# Patient Record
Sex: MALE | Race: WHITE | HISPANIC OR LATINO | Employment: UNEMPLOYED | URBAN - METROPOLITAN AREA
[De-identification: names, ages, dates, MRNs, and addresses within clinical notes are randomized per-mention and may not be internally consistent; named-entity substitution may affect disease eponyms.]

---

## 2018-01-16 NOTE — MISCELLANEOUS
Provider Comments  Provider Comments:   Patient was a no show to today's appointment at 1000        Signatures   Electronically signed by : EVIE Perry; Aug 12 2016 10:09AM EST                       (Author)

## 2020-12-29 ENCOUNTER — NURSE TRIAGE (OUTPATIENT)
Dept: OTHER | Facility: OTHER | Age: 65
End: 2020-12-29

## 2022-08-05 ENCOUNTER — APPOINTMENT (EMERGENCY)
Dept: RADIOLOGY | Facility: HOSPITAL | Age: 67
End: 2022-08-05
Payer: MEDICARE

## 2022-08-05 ENCOUNTER — HOSPITAL ENCOUNTER (EMERGENCY)
Facility: HOSPITAL | Age: 67
Discharge: HOME | End: 2022-08-05
Attending: EMERGENCY MEDICINE | Admitting: EMERGENCY MEDICINE
Payer: MEDICARE

## 2022-08-05 VITALS
BODY MASS INDEX: 20.44 KG/M2 | HEART RATE: 79 BPM | SYSTOLIC BLOOD PRESSURE: 160 MMHG | TEMPERATURE: 97.3 F | RESPIRATION RATE: 22 BRPM | HEIGHT: 64 IN | DIASTOLIC BLOOD PRESSURE: 90 MMHG | WEIGHT: 119.71 LBS | OXYGEN SATURATION: 100 %

## 2022-08-05 DIAGNOSIS — T50.901A OVERDOSE: Primary | ICD-10-CM

## 2022-08-05 LAB
AMPHETAMINES SERPL QL SCN: NEGATIVE
APAP SERPL-MCNC: <10 UG/ML (ref 10–20)
APTT PPP: 25 SECONDS (ref 23–37)
ATRIAL RATE: 77 BPM
BARBITURATES UR QL: NEGATIVE
BASOPHILS # BLD AUTO: 0.07 THOUSANDS/ΜL (ref 0–0.1)
BASOPHILS NFR BLD AUTO: 1 % (ref 0–1)
BENZODIAZ UR QL: NEGATIVE
CARDIAC TROPONIN I PNL SERPL HS: 4 NG/L
COCAINE UR QL: POSITIVE
EOSINOPHIL # BLD AUTO: 0.26 THOUSAND/ΜL (ref 0–0.61)
EOSINOPHIL NFR BLD AUTO: 4 % (ref 0–6)
ERYTHROCYTE [DISTWIDTH] IN BLOOD BY AUTOMATED COUNT: 13.7 % (ref 11.6–15.1)
ETHANOL SERPL-MCNC: <10 MG/DL (ref 0–10)
HCT VFR BLD AUTO: 40.2 % (ref 36.5–49.3)
HGB BLD-MCNC: 12.1 G/DL (ref 12–17)
IMM GRANULOCYTES # BLD AUTO: 0.01 THOUSAND/UL (ref 0–0.2)
IMM GRANULOCYTES NFR BLD AUTO: 0 % (ref 0–2)
INR PPP: 0.94 (ref 0.84–1.19)
LYMPHOCYTES # BLD AUTO: 3.55 THOUSANDS/ΜL (ref 0.6–4.47)
LYMPHOCYTES NFR BLD AUTO: 47 % (ref 14–44)
MCH RBC QN AUTO: 26.9 PG (ref 26.8–34.3)
MCHC RBC AUTO-ENTMCNC: 30.1 G/DL (ref 31.4–37.4)
MCV RBC AUTO: 90 FL (ref 82–98)
METHADONE UR QL: POSITIVE
MONOCYTES # BLD AUTO: 0.51 THOUSAND/ΜL (ref 0.17–1.22)
MONOCYTES NFR BLD AUTO: 7 % (ref 4–12)
NEUTROPHILS # BLD AUTO: 3.02 THOUSANDS/ΜL (ref 1.85–7.62)
NEUTS SEG NFR BLD AUTO: 41 % (ref 43–75)
NRBC BLD AUTO-RTO: 0 /100 WBCS
OPIATES UR QL SCN: NEGATIVE
OXYCODONE+OXYMORPHONE UR QL SCN: NEGATIVE
P AXIS: 72 DEGREES
PCP UR QL: NEGATIVE
PHOSPHATE SERPL-MCNC: 4.7 MG/DL (ref 2.5–4.8)
PLATELET # BLD AUTO: 315 THOUSANDS/UL (ref 149–390)
PMV BLD AUTO: 9.2 FL (ref 8.9–12.7)
PR INTERVAL: 172 MS
PROTHROMBIN TIME: 12.9 SECONDS (ref 11.6–14.5)
QRS AXIS: 35 DEGREES
QRSD INTERVAL: 82 MS
QT INTERVAL: 388 MS
QTC INTERVAL: 439 MS
RBC # BLD AUTO: 4.49 MILLION/UL (ref 3.88–5.62)
SALICYLATES SERPL-MCNC: <1 MG/DL (ref 10–30)
T WAVE AXIS: 48 DEGREES
THC UR QL: POSITIVE
TSH SERPL DL<=0.05 MIU/L-ACNC: 6.35 UIU/ML (ref 0.45–4.5)
VENTRICULAR RATE: 77 BPM
WBC # BLD AUTO: 7.42 THOUSAND/UL (ref 4.31–10.16)

## 2022-08-05 PROCEDURE — 84484 ASSAY OF TROPONIN QUANT: CPT | Performed by: EMERGENCY MEDICINE

## 2022-08-05 PROCEDURE — 80307 DRUG TEST PRSMV CHEM ANLYZR: CPT | Performed by: EMERGENCY MEDICINE

## 2022-08-05 PROCEDURE — 80179 DRUG ASSAY SALICYLATE: CPT | Performed by: EMERGENCY MEDICINE

## 2022-08-05 PROCEDURE — 85730 THROMBOPLASTIN TIME PARTIAL: CPT | Performed by: EMERGENCY MEDICINE

## 2022-08-05 PROCEDURE — 71045 X-RAY EXAM CHEST 1 VIEW: CPT

## 2022-08-05 PROCEDURE — 93010 ELECTROCARDIOGRAM REPORT: CPT

## 2022-08-05 PROCEDURE — 99291 CRITICAL CARE FIRST HOUR: CPT | Performed by: EMERGENCY MEDICINE

## 2022-08-05 PROCEDURE — 80143 DRUG ASSAY ACETAMINOPHEN: CPT | Performed by: EMERGENCY MEDICINE

## 2022-08-05 PROCEDURE — 84443 ASSAY THYROID STIM HORMONE: CPT | Performed by: EMERGENCY MEDICINE

## 2022-08-05 PROCEDURE — 93005 ELECTROCARDIOGRAM TRACING: CPT

## 2022-08-05 PROCEDURE — 82077 ASSAY SPEC XCP UR&BREATH IA: CPT | Performed by: EMERGENCY MEDICINE

## 2022-08-05 PROCEDURE — 85025 COMPLETE CBC W/AUTO DIFF WBC: CPT | Performed by: EMERGENCY MEDICINE

## 2022-08-05 PROCEDURE — 99284 EMERGENCY DEPT VISIT MOD MDM: CPT

## 2022-08-05 PROCEDURE — 85610 PROTHROMBIN TIME: CPT | Performed by: EMERGENCY MEDICINE

## 2022-08-05 PROCEDURE — 96374 THER/PROPH/DIAG INJ IV PUSH: CPT

## 2022-08-05 PROCEDURE — 96361 HYDRATE IV INFUSION ADD-ON: CPT

## 2022-08-05 PROCEDURE — 36415 COLL VENOUS BLD VENIPUNCTURE: CPT | Performed by: EMERGENCY MEDICINE

## 2022-08-05 PROCEDURE — 84100 ASSAY OF PHOSPHORUS: CPT | Performed by: EMERGENCY MEDICINE

## 2022-08-05 RX ORDER — NALOXONE HYDROCHLORIDE 1 MG/ML
1 INJECTION INTRAMUSCULAR; INTRAVENOUS; SUBCUTANEOUS ONCE
Status: COMPLETED | OUTPATIENT
Start: 2022-08-05 | End: 2022-08-05

## 2022-08-05 RX ORDER — NALOXONE HYDROCHLORIDE 1 MG/ML
INJECTION INTRAMUSCULAR; INTRAVENOUS; SUBCUTANEOUS
Status: COMPLETED
Start: 2022-08-05 | End: 2022-08-05

## 2022-08-05 RX ADMIN — NALOXONE HYDROCHLORIDE 1 MG: 1 INJECTION, SOLUTION INTRAMUSCULAR; INTRAVENOUS; SUBCUTANEOUS at 16:25

## 2022-08-05 RX ADMIN — SODIUM CHLORIDE 1000 ML: 9 INJECTION, SOLUTION INTRAVENOUS at 16:26

## 2022-08-05 RX ADMIN — NALOXONE HYDROCHLORIDE 1 MG: 1 INJECTION INTRAMUSCULAR; INTRAVENOUS; SUBCUTANEOUS at 16:25

## 2022-08-05 NOTE — ED NOTES
Pt states he would like to leave and states "I don't care if I have to sign a paper, I need to find my wallet " Provider aware        Leon Desir RN  08/05/22 6370

## 2022-08-05 NOTE — ED PROVIDER NOTES
History  Chief Complaint   Patient presents with    Overdose - Accidental     Patient arrives with his grand kids unresponsive; given 1mg Narcan IV and patient is now alert  Pt denies any drug use     HPI      This is a very pleasant, ill-appearing 71-year-old gentleman presents emergency room change of mental status, arrived here with family, patient immediately brought back from the triage room to room 1 with decreased respiratory effort  Patient was last seen 2 hours ago was found outside his family's house and hot ambient temp brought here for further evaluation  None       Past Medical History:   Diagnosis Date    Asthma        History reviewed  No pertinent surgical history  History reviewed  No pertinent family history  I have reviewed and agree with the history as documented  E-Cigarette/Vaping     E-Cigarette/Vaping Substances     Social History     Tobacco Use    Smoking status: Current Every Day Smoker     Packs/day: 0 50     Types: Cigarettes    Smokeless tobacco: Never Used   Substance Use Topics    Alcohol use: Not Currently    Drug use: Yes     Types: Marijuana       Review of Systems   Unable to perform ROS: Patient unresponsive       Physical Exam  Physical Exam  Vitals and nursing note reviewed  Constitutional:       Appearance: He is ill-appearing  HENT:      Head: Normocephalic and atraumatic  Right Ear: Tympanic membrane, ear canal and external ear normal       Left Ear: Tympanic membrane, ear canal and external ear normal       Nose: Nose normal       Mouth/Throat:      Mouth: Mucous membranes are moist       Pharynx: Oropharynx is clear  Eyes:      Extraocular Movements: Extraocular movements intact  Conjunctiva/sclera: Conjunctivae normal       Comments: Pupils 1 mm, minimally reactive  Cardiovascular:      Rate and Rhythm: Normal rate  Pulmonary:      Effort: Bradypnea present  Breath sounds: Normal breath sounds     Abdominal:      General: Abdomen is flat  Bowel sounds are normal    Musculoskeletal:         General: Normal range of motion  Cervical back: Normal range of motion  Skin:     General: Skin is warm  Neurological:      Mental Status: He is lethargic and disoriented  Psychiatric:      Comments: Full neuro exam could not be initially performed  Vital Signs  ED Triage Vitals   Temperature Pulse Respirations Blood Pressure SpO2   08/05/22 1633 08/05/22 1618 08/05/22 1618 08/05/22 1618 08/05/22 1618   (!) 97 3 °F (36 3 °C) 80 22 160/90 100 %      Temp Source Heart Rate Source Patient Position - Orthostatic VS BP Location FiO2 (%)   08/05/22 1633 08/05/22 1618 -- -- --   Oral Monitor         Pain Score       --                  Vitals:    08/05/22 1618 08/05/22 1700   BP: 160/90    Pulse: 80 79         Visual Acuity      ED Medications  Medications   naloxone (NARCAN) injection 1 mg (1 mg Intravenous Given 8/5/22 1625)   sodium chloride 0 9 % bolus 1,000 mL (0 mL Intravenous Stopped 8/5/22 1706)       Diagnostic Studies  Results Reviewed     Procedure Component Value Units Date/Time    HS Troponin I 4hr [[id number]]     Lab Status: No result Specimen: Blood     Rapid drug screen, urine [[id number]]  (Abnormal) Collected: 08/05/22 1702    Lab Status: Final result Specimen: Urine, Clean Catch Updated: 08/05/22 1748     Amph/Meth UR Negative     Barbiturate Ur Negative     Benzodiazepine Urine Negative     Cocaine Urine Positive     Methadone Urine Positive     Opiate Urine Negative     PCP Ur Negative     THC Urine Positive     Oxycodone Urine Negative    Narrative:      Presumptive report  If requested, specimen will be sent to reference lab for confirmation  FOR MEDICAL PURPOSES ONLY  IF CONFIRMATION NEEDED PLEASE CONTACT THE LAB WITHIN 5 DAYS      Drug Screen Cutoff Levels:  AMPHETAMINE/METHAMPHETAMINES  1000 ng/mL  BARBITURATES     200 ng/mL  BENZODIAZEPINES     200 ng/mL  COCAINE      300 ng/mL  METHADONE      300 ng/mL  OPIATES      300 ng/mL  PHENCYCLIDINE     25 ng/mL  THC       50 ng/mL  OXYCODONE      100 ng/mL    TSH [[id number]]  (Abnormal) Collected: 08/05/22 1622    Lab Status: Final result Specimen: Blood from Arm, Right Updated: 08/05/22 1744     TSH 3RD GENERATON 6 350 uIU/mL     Narrative:      Patients undergoing fluorescein dye angiography may retain small amounts of fluorescein in the body for 48-72 hours post procedure  Samples containing fluorescein can produce falsely depressed TSH values  If the patient had this procedure,a specimen should be resubmitted post fluorescein clearance        HS Troponin 0hr (reflex protocol) [[id number]]  (Normal) Collected: 08/05/22 1622    Lab Status: Final result Specimen: Blood from Arm, Right Updated: 08/05/22 1708     hs TnI 0hr 4 ng/L     HS Troponin I 2hr [[id number]]     Lab Status: No result Specimen: Blood     Phosphorus [[id number]]  (Normal) Collected: 08/05/22 1622    Lab Status: Final result Specimen: Blood from Arm, Right Updated: 08/05/22 1659     Phosphorus 4 7 mg/dL     Ethanol [[id number]]  (Normal) Collected: 08/05/22 1622    Lab Status: Final result Specimen: Blood from Arm, Right Updated: 08/05/22 1659     Ethanol Lvl <77 mg/dL     Salicylate level [[id number]]  (Abnormal) Collected: 08/05/22 1622    Lab Status: Final result Specimen: Blood from Arm, Right Updated: 88/25/38 6722     Salicylate Lvl <2 6 mg/dL     Acetaminophen level-"If concentration is detectable, please discuss with medical  on call " [[id number]]  (Abnormal) Collected: 08/05/22 1622    Lab Status: Final result Specimen: Blood from Arm, Right Updated: 08/05/22 1659     Acetaminophen Level <10 ug/mL     Protime-INR [[id number]]  (Normal) Collected: 08/05/22 1622    Lab Status: Final result Specimen: Blood from Arm, Right Updated: 08/05/22 1654     Protime 12 9 seconds      INR 0 94    APTT [[id number]]  (Normal) Collected: 08/05/22 1622    Lab Status: Final result Specimen: Blood from Arm, Right Updated: 08/05/22 1654     PTT 25 seconds     CBC and differential [[id number]]  (Abnormal) Collected: 08/05/22 1622    Lab Status: Final result Specimen: Blood from Arm, Right Updated: 08/05/22 1634     WBC 7 42 Thousand/uL      RBC 4 49 Million/uL      Hemoglobin 12 1 g/dL      Hematocrit 40 2 %      MCV 90 fL      MCH 26 9 pg      MCHC 30 1 g/dL      RDW 13 7 %      MPV 9 2 fL      Platelets 906 Thousands/uL      nRBC 0 /100 WBCs      Neutrophils Relative 41 %      Immat GRANS % 0 %      Lymphocytes Relative 47 %      Monocytes Relative 7 %      Eosinophils Relative 4 %      Basophils Relative 1 %      Neutrophils Absolute 3 02 Thousands/µL      Immature Grans Absolute 0 01 Thousand/uL      Lymphocytes Absolute 3 55 Thousands/µL      Monocytes Absolute 0 51 Thousand/µL      Eosinophils Absolute 0 26 Thousand/µL      Basophils Absolute 0 07 Thousands/µL                  XR chest 1 view portable   ED Interpretation by Gerardo Fernández III, DO (08/05 1646)   No acute fractures noted, old healed rib fractures: L sided: lateral: 6th and 7th rib, no PTX, no infiltrate  Procedures  ECG 12 Lead Documentation Only    Date/Time: 8/5/2022 4:30 PM  Performed by: Natacha Nelson DO  Authorized by: Gerardo Fernández III, DO     Indications / Diagnosis:  Overdose  ECG reviewed by me, the ED Provider: yes    Patient location:  ED  Comments:      I personally reviewed this EKG is performed the patient August 5, 2022, EKG was completed at 4:30 p m  Interpreted by me same time, normal sinus rhythm with no acute ST abnormalities, baseline artifact noted in V6 otherwise within normal limits  Ventricular rate of 77 beats per minute  No diffuse elevations to indicate pericarditis  No coved ST elevations greater than 2mm with negative T waves in V1-3 to indicate concern for brugada  No biphasic T waves in V2, V3 to indicate Wellens (critical stenosis of LAD)     No elevation in aVR or deviation when compared to V1 (can be associated with ST depression in I,II, V4-6 when left main occlusion is present)  CriticalCare Time  Performed by: Henry Gar DO  Authorized by: Henry Gar DO     Critical care provider statement:     Critical care time (minutes):  35    Critical care start time:  8/5/2022 4:19 PM    Critical care end time:  8/5/2022 5:00 PM    Critical care time was exclusive of:  Separately billable procedures and treating other patients    Critical care was necessary to treat or prevent imminent or life-threatening deterioration of the following conditions:  CNS failure or compromise, cardiac failure, circulatory failure and toxidrome             ED Course  ED Course as of 08/05/22 1913   Fri Aug 05, 2022   1619 PATIENT ARRIVED WITH PINPOINT PUPILS, decreased respiratory rate:  Low 12, O2 saturation of 88% on room air, outside for on accountable amount of time approximately 2 hours as per the son concern for possible opiate overdose, Narcan ordered, given a mental status improved GCS 15 pupils are now 3 mm reactive O2 saturation of % room air    1651 Reassessed, GCS 15, ESCALERA x 3, admits that he consumed marijuana today, potential for the marijuana upon to be placed with opiate, exam was not consistent with marijuana intoxication more consistent with opiate overdose which was unintentional   Patient is stable, O2 saturation 100%  MDM  Number of Diagnoses or Management Options  Overdose  Diagnosis management comments: 58-year-old gentle presents the emergency department with symptoms consistent with a opiate overdose    Patient admits that he was consumed marijuana but was found unresponsive by family members brought to the emergency department, pinpoint pupils, respiratory rate less than 12, concern for opiate overdose Narcan administered, symptoms resolved, after approximately an hour half the emergency department patient requested to be discharged, baseline labs unremarkable  EKG unremarkable, troponin levels negative  Drug screen was positive for methadone, cocaine and marijuana  Patient does go to [facility], denies use of cocaine  There has been care reports that acuity of marijuana laced with fentanyl which would explain patient's presentation aches he smokes marijuana for the last 51 years  Patient stable at discharge    Portions of the record may have been created with voice recognition software  Occasional wrong word or "sound a like" substitutions may have occurred due to the inherent limitations of voice recognition software  Read the chart carefully and recognize, using context, where substitutions have occurred  Counseling: I had a detailed discussion with the patient and/or guardian regarding: the historical points, exam findings, and any diagnostic results supporting the discharge diagnosis, lab results, radiology results, discharge instructions reviewed with patient and/or family/caregiver and understanding was verbalized   Instructions given to return to the emergency department if symptoms worsen or persist, or if there are any questions or concerns that arise at home         Amount and/or Complexity of Data Reviewed  Clinical lab tests: ordered and reviewed  Tests in the radiology section of CPT®: ordered and reviewed  Tests in the medicine section of CPT®: ordered and reviewed  Discussion of test results with the performing providers: yes  Decide to obtain previous medical records or to obtain history from someone other than the patient: yes  Obtain history from someone other than the patient: yes (Granddaughter and grandson )  Review and summarize past medical records: yes  Discuss the patient with other providers: yes  Independent visualization of images, tracings, or specimens: yes        Disposition  Final diagnoses:   Overdose     Time reflects when diagnosis was documented in both MDM as applicable and the Disposition within this note     Time User Action Codes Description Comment    8/5/2022  5:54 PM [place], [address] Overdose       ED Disposition     ED Disposition   Discharge    Condition   Stable    Date/Time   Fri Aug 5, 2022  5:54 PM    [address] [facility] discharge to home/self care  Follow-up Information    None         There are no discharge medications for this patient  No discharge procedures on file      PDMP Review     None          ED Provider  Electronically Signed by           Xiomara Alicia III, DO  08/05/22 0628

## 2022-08-05 NOTE — ED NOTES
Pt ambulated to restroom on unit with no difficulty and with steady gait        Teresita Samaniego RN  08/05/22 8113

## 2022-10-25 ENCOUNTER — APPOINTMENT (EMERGENCY)
Dept: RADIOLOGY | Facility: HOSPITAL | Age: 67
End: 2022-10-25
Payer: MEDICARE

## 2022-10-25 ENCOUNTER — APPOINTMENT (EMERGENCY)
Dept: CT IMAGING | Facility: HOSPITAL | Age: 67
End: 2022-10-25
Payer: MEDICARE

## 2022-10-25 ENCOUNTER — HOSPITAL ENCOUNTER (EMERGENCY)
Facility: HOSPITAL | Age: 67
Discharge: HOME | End: 2022-10-25
Attending: EMERGENCY MEDICINE
Payer: MEDICARE

## 2022-10-25 VITALS
OXYGEN SATURATION: 100 % | DIASTOLIC BLOOD PRESSURE: 70 MMHG | SYSTOLIC BLOOD PRESSURE: 132 MMHG | TEMPERATURE: 96.8 F | HEART RATE: 73 BPM | RESPIRATION RATE: 18 BRPM | BODY MASS INDEX: 20.36 KG/M2 | WEIGHT: 118.61 LBS

## 2022-10-25 DIAGNOSIS — R42 DIZZINESS: Primary | ICD-10-CM

## 2022-10-25 DIAGNOSIS — R06.02 SHORTNESS OF BREATH: ICD-10-CM

## 2022-10-25 LAB
ALBUMIN SERPL BCP-MCNC: 4.1 G/DL (ref 3.5–5)
ALP SERPL-CCNC: 71 U/L (ref 43–122)
ALT SERPL W P-5'-P-CCNC: 17 U/L
AMPHETAMINES SERPL QL SCN: NEGATIVE
ANION GAP SERPL CALCULATED.3IONS-SCNC: 8 MMOL/L (ref 5–14)
APTT PPP: 27 SECONDS (ref 23–37)
AST SERPL W P-5'-P-CCNC: 30 U/L (ref 17–59)
ATRIAL RATE: 75 BPM
BACTERIA UR QL AUTO: ABNORMAL /HPF
BARBITURATES UR QL: NEGATIVE
BASOPHILS # BLD AUTO: 0.05 THOUSANDS/ÂΜL (ref 0–0.1)
BASOPHILS NFR BLD AUTO: 1 % (ref 0–1)
BENZODIAZ UR QL: NEGATIVE
BILIRUB SERPL-MCNC: 0.41 MG/DL (ref 0.2–1)
BILIRUB UR QL STRIP: NEGATIVE
BUN SERPL-MCNC: 12 MG/DL (ref 5–25)
CALCIUM SERPL-MCNC: 9 MG/DL (ref 8.4–10.2)
CARDIAC TROPONIN I PNL SERPL HS: 3 NG/L
CHLORIDE SERPL-SCNC: 107 MMOL/L (ref 96–108)
CLARITY UR: CLEAR
CO2 SERPL-SCNC: 25 MMOL/L (ref 21–32)
COCAINE UR QL: POSITIVE
COLOR UR: YELLOW
CREAT SERPL-MCNC: 0.82 MG/DL (ref 0.7–1.5)
EOSINOPHIL # BLD AUTO: 0.12 THOUSAND/ÂΜL (ref 0–0.61)
EOSINOPHIL NFR BLD AUTO: 2 % (ref 0–6)
ERYTHROCYTE [DISTWIDTH] IN BLOOD BY AUTOMATED COUNT: 13.7 % (ref 11.6–15.1)
GFR SERPL CREATININE-BSD FRML MDRD: 92 ML/MIN/1.73SQ M
GLUCOSE SERPL-MCNC: 88 MG/DL (ref 70–99)
GLUCOSE UR STRIP-MCNC: NEGATIVE MG/DL
HCT VFR BLD AUTO: 38 % (ref 36.5–49.3)
HGB BLD-MCNC: 11.3 G/DL (ref 12–17)
HGB UR QL STRIP.AUTO: 25
IMM GRANULOCYTES # BLD AUTO: 0.02 THOUSAND/UL (ref 0–0.2)
IMM GRANULOCYTES NFR BLD AUTO: 0 % (ref 0–2)
INR PPP: 0.98 (ref 0.84–1.19)
KETONES UR STRIP-MCNC: NEGATIVE MG/DL
LEUKOCYTE ESTERASE UR QL STRIP: NEGATIVE
LYMPHOCYTES # BLD AUTO: 1.77 THOUSANDS/ÂΜL (ref 0.6–4.47)
LYMPHOCYTES NFR BLD AUTO: 26 % (ref 14–44)
MCH RBC QN AUTO: 27 PG (ref 26.8–34.3)
MCHC RBC AUTO-ENTMCNC: 29.7 G/DL (ref 31.4–37.4)
MCV RBC AUTO: 91 FL (ref 82–98)
METHADONE UR QL: NEGATIVE
MONOCYTES # BLD AUTO: 0.38 THOUSAND/ÂΜL (ref 0.17–1.22)
MONOCYTES NFR BLD AUTO: 6 % (ref 4–12)
MUCOUS THREADS UR QL AUTO: ABNORMAL
NEUTROPHILS # BLD AUTO: 4.53 THOUSANDS/ÂΜL (ref 1.85–7.62)
NEUTS SEG NFR BLD AUTO: 65 % (ref 43–75)
NITRITE UR QL STRIP: NEGATIVE
NON-SQ EPI CELLS URNS QL MICRO: ABNORMAL /HPF
NRBC BLD AUTO-RTO: 0 /100 WBCS
NT-PROBNP SERPL-MCNC: 123 PG/ML (ref 0–299)
OPIATES UR QL SCN: NEGATIVE
OXYCODONE+OXYMORPHONE UR QL SCN: NEGATIVE
P AXIS: 73 DEGREES
PCP UR QL: NEGATIVE
PH UR STRIP.AUTO: 6 [PH]
PLATELET # BLD AUTO: 288 THOUSANDS/UL (ref 149–390)
PMV BLD AUTO: 9.3 FL (ref 8.9–12.7)
POTASSIUM SERPL-SCNC: 4.7 MMOL/L (ref 3.5–5.3)
PR INTERVAL: 156 MS
PROT SERPL-MCNC: 8.1 G/DL (ref 6.4–8.4)
PROT UR STRIP-MCNC: NEGATIVE MG/DL
PROTHROMBIN TIME: 13.2 SECONDS (ref 11.6–14.5)
QRS AXIS: 24 DEGREES
QRSD INTERVAL: 84 MS
QT INTERVAL: 378 MS
QTC INTERVAL: 422 MS
RBC # BLD AUTO: 4.19 MILLION/UL (ref 3.88–5.62)
RBC #/AREA URNS AUTO: ABNORMAL /HPF
SODIUM SERPL-SCNC: 140 MMOL/L (ref 135–147)
SP GR UR STRIP.AUTO: 1.02 (ref 1–1.04)
T WAVE AXIS: 59 DEGREES
THC UR QL: POSITIVE
UROBILINOGEN UA: NEGATIVE MG/DL
VENTRICULAR RATE: 75 BPM
WBC # BLD AUTO: 6.87 THOUSAND/UL (ref 4.31–10.16)
WBC #/AREA URNS AUTO: ABNORMAL /HPF

## 2022-10-25 PROCEDURE — 36415 COLL VENOUS BLD VENIPUNCTURE: CPT | Performed by: EMERGENCY MEDICINE

## 2022-10-25 PROCEDURE — 93005 ELECTROCARDIOGRAM TRACING: CPT

## 2022-10-25 PROCEDURE — G1004 CDSM NDSC: HCPCS

## 2022-10-25 PROCEDURE — 85025 COMPLETE CBC W/AUTO DIFF WBC: CPT | Performed by: EMERGENCY MEDICINE

## 2022-10-25 PROCEDURE — 70450 CT HEAD/BRAIN W/O DYE: CPT

## 2022-10-25 PROCEDURE — 84484 ASSAY OF TROPONIN QUANT: CPT | Performed by: EMERGENCY MEDICINE

## 2022-10-25 PROCEDURE — 93010 ELECTROCARDIOGRAM REPORT: CPT | Performed by: INTERNAL MEDICINE

## 2022-10-25 PROCEDURE — 83880 ASSAY OF NATRIURETIC PEPTIDE: CPT | Performed by: EMERGENCY MEDICINE

## 2022-10-25 PROCEDURE — 71046 X-RAY EXAM CHEST 2 VIEWS: CPT

## 2022-10-25 PROCEDURE — 81003 URINALYSIS AUTO W/O SCOPE: CPT | Performed by: EMERGENCY MEDICINE

## 2022-10-25 PROCEDURE — 85610 PROTHROMBIN TIME: CPT | Performed by: EMERGENCY MEDICINE

## 2022-10-25 PROCEDURE — 85730 THROMBOPLASTIN TIME PARTIAL: CPT | Performed by: EMERGENCY MEDICINE

## 2022-10-25 PROCEDURE — 80053 COMPREHEN METABOLIC PANEL: CPT | Performed by: EMERGENCY MEDICINE

## 2022-10-25 PROCEDURE — 80307 DRUG TEST PRSMV CHEM ANLYZR: CPT | Performed by: EMERGENCY MEDICINE

## 2022-10-25 PROCEDURE — 81001 URINALYSIS AUTO W/SCOPE: CPT | Performed by: EMERGENCY MEDICINE

## 2022-10-25 RX ORDER — ALBUTEROL SULFATE 90 UG/1
2 AEROSOL, METERED RESPIRATORY (INHALATION) ONCE
Status: COMPLETED | OUTPATIENT
Start: 2022-10-25 | End: 2022-10-25

## 2022-10-25 RX ORDER — MECLIZINE HCL 12.5 MG/1
25 TABLET ORAL ONCE
Status: COMPLETED | OUTPATIENT
Start: 2022-10-25 | End: 2022-10-25

## 2022-10-25 RX ORDER — MECLIZINE HYDROCHLORIDE 25 MG/1
25 TABLET ORAL 3 TIMES DAILY PRN
Qty: 30 TABLET | Refills: 0 | Status: SHIPPED | OUTPATIENT
Start: 2022-10-25

## 2022-10-25 RX ORDER — IPRATROPIUM BROMIDE AND ALBUTEROL SULFATE 2.5; .5 MG/3ML; MG/3ML
3 SOLUTION RESPIRATORY (INHALATION) ONCE
Status: COMPLETED | OUTPATIENT
Start: 2022-10-25 | End: 2022-10-25

## 2022-10-25 RX ADMIN — SODIUM CHLORIDE 1000 ML: 0.9 INJECTION, SOLUTION INTRAVENOUS at 14:51

## 2022-10-25 RX ADMIN — MECLIZINE 25 MG: 12.5 TABLET ORAL at 14:04

## 2022-10-25 RX ADMIN — ALBUTEROL SULFATE 2 PUFF: 90 AEROSOL, METERED RESPIRATORY (INHALATION) at 16:12

## 2022-10-25 RX ADMIN — IPRATROPIUM BROMIDE AND ALBUTEROL SULFATE 3 ML: .5; 3 SOLUTION RESPIRATORY (INHALATION) at 14:04

## 2022-10-25 NOTE — ED PROVIDER NOTES
History  Chief Complaint   Patient presents with   • Dizziness     Pt came to ER c/o dizziness and dyspnea fir 1 5 weeks  Pt denies CP  79 yo male with a history of asthma and polysubstance abuse presents to the ED complaining of intermittent dizziness and shortness of breath x 10 days  The patient reports very transient episodes of "everything spinning" with sudden position changes  No visual disturbance  No falls or syncopal events  He denies nausea and vomiting  (+) Secondary complaint of shortness of breath x "a while now"  The patient says he is "always" short of breath but has been noticing it more frequently lately, especially with exertion  No chest pain  No diaphoresis  He denies cough and hemoptysis  No LE swelling/pain  No numbness/weakness  No other specific complaints  None       Past Medical History:   Diagnosis Date   • Asthma        History reviewed  No pertinent surgical history  History reviewed  No pertinent family history  I have reviewed and agree with the history as documented  E-Cigarette/Vaping     E-Cigarette/Vaping Substances     Social History     Tobacco Use   • Smoking status: Current Every Day Smoker     Packs/day: 0 50     Types: Cigarettes   • Smokeless tobacco: Never Used   Substance Use Topics   • Alcohol use: Not Currently   • Drug use: Yes     Types: Marijuana       Review of Systems   Constitutional: Negative for chills and fever  HENT: Negative for sore throat  Respiratory: Positive for shortness of breath  Negative for cough  Cardiovascular: Negative for chest pain and palpitations  Gastrointestinal: Negative for abdominal pain, diarrhea, nausea and vomiting  Endocrine: Negative for cold intolerance and heat intolerance  Genitourinary: Negative for dysuria and flank pain  Musculoskeletal: Negative for back pain  Skin: Negative for rash  Allergic/Immunologic: Negative for immunocompromised state  Neurological: Positive for dizziness  Negative for weakness, light-headedness, numbness and headaches  Hematological: Negative for adenopathy  Psychiatric/Behavioral: The patient is not nervous/anxious  Physical Exam  Physical Exam  Constitutional:       General: He is not in acute distress  Appearance: He is well-developed  HENT:      Head: Normocephalic and atraumatic  Eyes:      Pupils: Pupils are equal, round, and reactive to light  Cardiovascular:      Rate and Rhythm: Normal rate and regular rhythm  Pulmonary:      Effort: Pulmonary effort is normal  No respiratory distress  Breath sounds: Normal breath sounds  Abdominal:      General: There is no distension  Palpations: Abdomen is soft  Tenderness: There is no abdominal tenderness  Musculoskeletal:         General: Normal range of motion  Cervical back: Normal range of motion and neck supple  Skin:     General: Skin is warm and dry  Neurological:      General: No focal deficit present  Mental Status: He is alert and oriented to person, place, and time  Cranial Nerves: Cranial nerves are intact  Sensory: Sensation is intact  Motor: Motor function is intact  Coordination: Coordination is intact  Romberg sign negative  Gait: Gait is intact  Deep Tendon Reflexes: Reflexes are normal and symmetric           Vital Signs  ED Triage Vitals [10/25/22 1303]   Temperature Pulse Respirations Blood Pressure SpO2   (!) 96 8 °F (36 °C) 82 20 (!) 133/103 100 %      Temp Source Heart Rate Source Patient Position - Orthostatic VS BP Location FiO2 (%)   Tympanic Monitor Sitting Left arm --      Pain Score       --           Vitals:    10/25/22 1303 10/25/22 1612   BP: (!) 133/103 132/70   Pulse: 82 73   Patient Position - Orthostatic VS: Sitting Lying         Visual Acuity      ED Medications  Medications   sodium chloride 0 9 % bolus 1,000 mL (0 mL Intravenous Stopped 10/25/22 1609)   ipratropium-albuterol (DUO-NEB) 0 5-2 5 mg/3 mL inhalation solution 3 mL (3 mL Nebulization Given 10/25/22 1404)   meclizine (ANTIVERT) tablet 25 mg (25 mg Oral Given 10/25/22 1404)   albuterol (PROVENTIL HFA,VENTOLIN HFA) inhaler 2 puff (2 puffs Inhalation Given 10/25/22 1612)       Diagnostic Studies  Results Reviewed     Procedure Component Value Units Date/Time    Urine Microscopic [[id number]]  (Abnormal) Collected: 10/25/22 1426    Lab Status: Final result Specimen: Urine, Clean Catch Updated: 10/25/22 1503     RBC, UA 1-2 /hpf      WBC, UA None Seen /hpf      Epithelial Cells Occasional /hpf      Bacteria, UA Occasional /hpf      MUCUS THREADS Occasional    Protime-INR [[id number]]  (Normal) Collected: 10/25/22 1438    Lab Status: Final result Specimen: Blood from Arm, Right Updated: 10/25/22 1501     Protime 13 2 seconds      INR 0 98    APTT [[id number]]  (Normal) Collected: 10/25/22 1438    Lab Status: Final result Specimen: Blood from Arm, Right Updated: 10/25/22 1501     PTT 27 seconds     Rapid drug screen, urine [[id number]]  (Abnormal) Collected: 10/25/22 1425    Lab Status: Final result Specimen: Urine, Clean Catch Updated: 10/25/22 1459     Amph/Meth UR Negative     Barbiturate Ur Negative     Benzodiazepine Urine Negative     Cocaine Urine Positive     Methadone Urine Negative     Opiate Urine Negative     PCP Ur Negative     THC Urine Positive     Oxycodone Urine Negative    Narrative:      Presumptive report  If requested, specimen will be sent to reference lab for confirmation  FOR MEDICAL PURPOSES ONLY  IF CONFIRMATION NEEDED PLEASE CONTACT THE LAB WITHIN 5 DAYS      Drug Screen Cutoff Levels:  AMPHETAMINE/METHAMPHETAMINES  1000 ng/mL  BARBITURATES     200 ng/mL  BENZODIAZEPINES     200 ng/mL  COCAINE      300 ng/mL  METHADONE      300 ng/mL  OPIATES      300 ng/mL  PHENCYCLIDINE     25 ng/mL  THC       50 ng/mL  OXYCODONE      100 ng/mL    HS Troponin 0hr (reflex protocol) [[id number]]  (Normal) Collected: 10/25/22 1416    Lab Status: Final result Specimen: Blood from Arm, Right Updated: 10/25/22 1450     hs TnI 0hr 3 ng/L     UA w Reflex to Microscopic w Reflex to Culture [[id number]]  (Abnormal) Collected: 10/25/22 1426    Lab Status: Final result Specimen: Urine, Clean Catch Updated: 10/25/22 1446     Color, UA Yellow     Clarity, UA Clear     Specific Gravity, UA 1 025     pH, UA 6 0     Leukocytes, UA Negative     Nitrite, UA Negative     Protein, UA Negative mg/dl      Glucose, UA Negative mg/dl      Ketones, UA Negative mg/dl      Bilirubin, UA Negative     Occult Blood, UA 25 0     UROBILINOGEN UA Negative mg/dL     NT-BNP PRO [[id number]]  (Normal) Collected: 10/25/22 1416    Lab Status: Final result Specimen: Blood from Arm, Right Updated: 10/25/22 1446     NT-proBNP 123 pg/mL     Comprehensive metabolic panel [[id number]] Collected: 10/25/22 1416    Lab Status: Final result Specimen: Blood from Arm, Right Updated: 10/25/22 1442     Sodium 140 mmol/L      Potassium 4 7 mmol/L      Chloride 107 mmol/L      CO2 25 mmol/L      ANION GAP 8 mmol/L      BUN 12 mg/dL      Creatinine 0 82 mg/dL      Glucose 88 mg/dL      Calcium 9 0 mg/dL      AST 30 U/L      ALT 17 U/L      Alkaline Phosphatase 71 U/L      Total Protein 8 1 g/dL      Albumin 4 1 g/dL      Total Bilirubin 0 41 mg/dL      eGFR 92 ml/min/1 73sq m     Narrative:      Jania guidelines for Chronic Kidney Disease (CKD):   •  Stage 1 with normal or high GFR (GFR > 90 mL/min/1 73 square meters)  •  Stage 2 Mild CKD (GFR = 60-89 mL/min/1 73 square meters)  •  Stage 3A Moderate CKD (GFR = 45-59 mL/min/1 73 square meters)  •  Stage 3B Moderate CKD (GFR = 30-44 mL/min/1 73 square meters)  •  Stage 4 Severe CKD (GFR = 15-29 mL/min/1 73 square meters)  •  Stage 5 End Stage CKD (GFR <15 mL/min/1 73 square meters)  Note: GFR calculation is accurate only with a steady state creatinine    CBC and differential [[id number]]  (Abnormal) Collected: 10/25/22 1416    Lab Status: Final result Specimen: Blood from Arm, Right Updated: 10/25/22 1429     WBC 6 87 Thousand/uL      RBC 4 19 Million/uL      Hemoglobin 11 3 g/dL      Hematocrit 38 0 %      MCV 91 fL      MCH 27 0 pg      MCHC 29 7 g/dL      RDW 13 7 %      MPV 9 3 fL      Platelets 936 Thousands/uL      nRBC 0 /100 WBCs      Neutrophils Relative 65 %      Immat GRANS % 0 %      Lymphocytes Relative 26 %      Monocytes Relative 6 %      Eosinophils Relative 2 %      Basophils Relative 1 %      Neutrophils Absolute 4 53 Thousands/µL      Immature Grans Absolute 0 02 Thousand/uL      Lymphocytes Absolute 1 77 Thousands/µL      Monocytes Absolute 0 38 Thousand/µL      Eosinophils Absolute 0 12 Thousand/µL      Basophils Absolute 0 05 Thousands/µL                  CT head without contrast   Final Result by Molly Doshi MD (10/25 1534)      No acute intracranial abnormality  Workstation performed: [id number]         XR chest 2 views    (Results Pending)              Procedures  ECG 12 Lead Documentation Only    Date/Time: 10/25/2022 8:44 PM  Performed by: Neema Richey MD  Authorized by: Neema Richey MD     Indications / Diagnosis:  Dizziness, SOB  ECG reviewed by me, the ED Provider: yes    Patient location:  ED  Interpretation:     Interpretation: normal    Rate:     ECG rate:  75 bpm    ECG rate assessment: normal    Rhythm:     Rhythm: sinus rhythm    Ectopy:     Ectopy: none    QRS:     QRS axis:  Normal    QRS intervals:  Normal  Conduction:     Conduction: normal    ST segments:     ST segments:  Normal  T waves:     T waves: normal               ED Course                                             MDM  Number of Diagnoses or Management Options  Dizziness  Shortness of breath  Diagnosis management comments: The patient is well appearing with stable vital signs and a benign exam  Lungs are CTA B/L and neurologic exam is nonfocal  Will check EKG, CXR, head CT, basic labs, troponin, and UA   IVFs, meclizine, and Duo-neb ordered  Will continue to monitor in the ED  16:05 All symptoms resolved --> no dizziness or SOB  Workup unremarkable  The patient says he feels "much better" and is requesting discharge  Albuterol MDI refilled in the ED  Plan for meclizine prn and close follow up with his PCP later this week for reassessment  The patient is agreeable to this plan  Strict return precautions provided  Amount and/or Complexity of Data Reviewed  Clinical lab tests: ordered and reviewed  Tests in the radiology section of CPT®: ordered and reviewed  Tests in the medicine section of CPT®: ordered and reviewed    Risk of Complications, Morbidity, and/or Mortality  Presenting problems: high  Diagnostic procedures: high  Management options: high    Patient Progress  Patient progress: improved      Disposition  Final diagnoses:   Dizziness   Shortness of breath     Time reflects when diagnosis was documented in both MDM as applicable and the Disposition within this note     Time User Action Codes Description Comment    10/25/2022  4:05 PM Oli Cesar Add [R42] Dizziness     10/25/2022  4:06 PM Elena Malloy Add [R06 02] Shortness of breath       ED Disposition     ED Disposition   Discharge    Condition   Stable    Date/Time   Tue Oct 25, 2022  4:05 PM    [address] [facility] [address] discharge to home/self care  Follow-up Information     Follow up With Specialties Details Why Contact Info    your family doctor  Schedule an appointment as soon as possible for a visit             Discharge Medication List as of 10/25/2022  4:09 PM      START taking these medications    Details   meclizine (ANTIVERT) 25 mg tablet Take 1 tablet (25 mg total) by mouth 3 (three) times a day as needed for dizziness, Starting Tue 10/25/2022, Normal             No discharge procedures on file      PDMP Review     None          ED Provider  Electronically Signed by           Nayeli Blanton MD  10/26/22 3473

## 2023-07-17 ENCOUNTER — APPOINTMENT (EMERGENCY)
Dept: RADIOLOGY | Facility: HOSPITAL | Age: 68
End: 2023-07-17
Payer: MEDICARE

## 2023-07-17 ENCOUNTER — HOSPITAL ENCOUNTER (EMERGENCY)
Facility: HOSPITAL | Age: 68
Discharge: HOME | End: 2023-07-17
Attending: EMERGENCY MEDICINE
Payer: MEDICARE

## 2023-07-17 ENCOUNTER — APPOINTMENT (EMERGENCY)
Dept: CT IMAGING | Facility: HOSPITAL | Age: 68
End: 2023-07-17
Payer: MEDICARE

## 2023-07-17 VITALS
OXYGEN SATURATION: 97 % | SYSTOLIC BLOOD PRESSURE: 144 MMHG | WEIGHT: 112.7 LBS | RESPIRATION RATE: 16 BRPM | HEART RATE: 68 BPM | DIASTOLIC BLOOD PRESSURE: 83 MMHG | BODY MASS INDEX: 19.34 KG/M2 | TEMPERATURE: 98.1 F

## 2023-07-17 DIAGNOSIS — R10.11 RUQ PAIN: Primary | ICD-10-CM

## 2023-07-17 DIAGNOSIS — R06.02 SHORTNESS OF BREATH: ICD-10-CM

## 2023-07-17 LAB
2HR DELTA HS TROPONIN: 6 NG/L
ALBUMIN SERPL BCP-MCNC: 4.4 G/DL (ref 3.5–5)
ALP SERPL-CCNC: 75 U/L (ref 34–104)
ALT SERPL W P-5'-P-CCNC: 9 U/L (ref 7–52)
ANION GAP SERPL CALCULATED.3IONS-SCNC: 7 MMOL/L
AST SERPL W P-5'-P-CCNC: 25 U/L (ref 13–39)
ATRIAL RATE: 66 BPM
BASOPHILS # BLD AUTO: 0.06 THOUSANDS/ÂΜL (ref 0–0.1)
BASOPHILS NFR BLD AUTO: 1 % (ref 0–1)
BILIRUB SERPL-MCNC: 0.29 MG/DL (ref 0.2–1)
BNP SERPL-MCNC: 67 PG/ML (ref 0–100)
BUN SERPL-MCNC: 14 MG/DL (ref 5–25)
CALCIUM SERPL-MCNC: 9.6 MG/DL (ref 8.4–10.2)
CARDIAC TROPONIN I PNL SERPL HS: 11 NG/L
CARDIAC TROPONIN I PNL SERPL HS: 5 NG/L
CHLORIDE SERPL-SCNC: 102 MMOL/L (ref 96–108)
CO2 SERPL-SCNC: 27 MMOL/L (ref 21–32)
CREAT SERPL-MCNC: 0.87 MG/DL (ref 0.6–1.3)
EOSINOPHIL # BLD AUTO: 0.16 THOUSAND/ÂΜL (ref 0–0.61)
EOSINOPHIL NFR BLD AUTO: 2 % (ref 0–6)
ERYTHROCYTE [DISTWIDTH] IN BLOOD BY AUTOMATED COUNT: 14.1 % (ref 11.6–15.1)
GFR SERPL CREATININE-BSD FRML MDRD: 89 ML/MIN/1.73SQ M
GLUCOSE SERPL-MCNC: 84 MG/DL (ref 65–140)
HCT VFR BLD AUTO: 39.8 % (ref 36.5–49.3)
HGB BLD-MCNC: 12.2 G/DL (ref 12–17)
IMM GRANULOCYTES # BLD AUTO: 0.01 THOUSAND/UL (ref 0–0.2)
IMM GRANULOCYTES NFR BLD AUTO: 0 % (ref 0–2)
LIPASE SERPL-CCNC: 48 U/L (ref 11–82)
LYMPHOCYTES # BLD AUTO: 2.32 THOUSANDS/ÂΜL (ref 0.6–4.47)
LYMPHOCYTES NFR BLD AUTO: 30 % (ref 14–44)
MCH RBC QN AUTO: 27.1 PG (ref 26.8–34.3)
MCHC RBC AUTO-ENTMCNC: 30.7 G/DL (ref 31.4–37.4)
MCV RBC AUTO: 88 FL (ref 82–98)
MONOCYTES # BLD AUTO: 0.42 THOUSAND/ÂΜL (ref 0.17–1.22)
MONOCYTES NFR BLD AUTO: 5 % (ref 4–12)
NEUTROPHILS # BLD AUTO: 4.77 THOUSANDS/ÂΜL (ref 1.85–7.62)
NEUTS SEG NFR BLD AUTO: 62 % (ref 43–75)
NRBC BLD AUTO-RTO: 0 /100 WBCS
P AXIS: 67 DEGREES
PLATELET # BLD AUTO: 364 THOUSANDS/UL (ref 149–390)
PMV BLD AUTO: 9.1 FL (ref 8.9–12.7)
POTASSIUM SERPL-SCNC: 4.8 MMOL/L (ref 3.5–5.3)
PR INTERVAL: 174 MS
PROT SERPL-MCNC: 8.7 G/DL (ref 6.4–8.4)
QRS AXIS: 54 DEGREES
QRSD INTERVAL: 76 MS
QT INTERVAL: 396 MS
QTC INTERVAL: 415 MS
RBC # BLD AUTO: 4.51 MILLION/UL (ref 3.88–5.62)
SODIUM SERPL-SCNC: 136 MMOL/L (ref 135–147)
T WAVE AXIS: 65 DEGREES
VENTRICULAR RATE: 66 BPM
WBC # BLD AUTO: 7.74 THOUSAND/UL (ref 4.31–10.16)

## 2023-07-17 PROCEDURE — G1004 CDSM NDSC: HCPCS

## 2023-07-17 PROCEDURE — 71046 X-RAY EXAM CHEST 2 VIEWS: CPT

## 2023-07-17 PROCEDURE — 85025 COMPLETE CBC W/AUTO DIFF WBC: CPT

## 2023-07-17 PROCEDURE — 83690 ASSAY OF LIPASE: CPT

## 2023-07-17 PROCEDURE — 36415 COLL VENOUS BLD VENIPUNCTURE: CPT

## 2023-07-17 PROCEDURE — 74177 CT ABD & PELVIS W/CONTRAST: CPT

## 2023-07-17 PROCEDURE — 84484 ASSAY OF TROPONIN QUANT: CPT

## 2023-07-17 PROCEDURE — 93005 ELECTROCARDIOGRAM TRACING: CPT

## 2023-07-17 PROCEDURE — 83880 ASSAY OF NATRIURETIC PEPTIDE: CPT

## 2023-07-17 PROCEDURE — 80053 COMPREHEN METABOLIC PANEL: CPT

## 2023-07-17 PROCEDURE — 93010 ELECTROCARDIOGRAM REPORT: CPT | Performed by: STUDENT IN AN ORGANIZED HEALTH CARE EDUCATION/TRAINING PROGRAM

## 2023-07-17 RX ORDER — ALBUTEROL SULFATE 90 UG/1
2 AEROSOL, METERED RESPIRATORY (INHALATION) ONCE
Status: COMPLETED | OUTPATIENT
Start: 2023-07-17 | End: 2023-07-17

## 2023-07-17 RX ORDER — ALBUTEROL SULFATE 90 UG/1
2 AEROSOL, METERED RESPIRATORY (INHALATION) EVERY 6 HOURS PRN
Qty: 8.5 G | Refills: 0 | Status: SHIPPED | OUTPATIENT
Start: 2023-07-17

## 2023-07-17 RX ADMIN — ALBUTEROL SULFATE 2 PUFF: 90 AEROSOL, METERED RESPIRATORY (INHALATION) at 21:03

## 2023-07-17 RX ADMIN — IOHEXOL 100 ML: 350 INJECTION, SOLUTION INTRAVENOUS at 18:52

## 2023-07-18 NOTE — ED PROVIDER NOTES
History  Chief Complaint   Patient presents with   • Hernia     RUQ -for weeks. 78 y.o. M with past medical history of asthma presents to emergency department complaining of right upper quadrant pain for weeks. He reports he sometimes notices a bulge. He denies overlying skin changes. Pain is intermittent. Also reports orthopnea for weeks. History provided by:  Patient  Abdominal Pain  Pain location:  RUQ  Associated symptoms: shortness of breath    Associated symptoms: no anorexia, no chest pain, no chills, no constipation, no cough, no diarrhea, no dysuria, no fatigue, no fever, no hematemesis, no hematochezia, no hematuria, no nausea, no sore throat and no vomiting    Shortness of breath:     Timing:  Intermittent  Shortness of Breath  Associated symptoms: abdominal pain and sputum production    Associated symptoms: no chest pain, no claudication, no cough, no diaphoresis, no fever, no hemoptysis, no rash, no sore throat, no vomiting and no wheezing    Risk factors: no family hx of DVT, no hx of cancer, no hx of PE/DVT, no prolonged immobilization and no recent surgery        Prior to Admission Medications   Prescriptions Last Dose Informant Patient Reported? Taking?   meclizine (ANTIVERT) 25 mg tablet   No No   Sig: Take 1 tablet (25 mg total) by mouth 3 (three) times a day as needed for dizziness      Facility-Administered Medications: None       Past Medical History:   Diagnosis Date   • Asthma        History reviewed. No pertinent surgical history. History reviewed. No pertinent family history. I have reviewed and agree with the history as documented.     E-Cigarette/Vaping     E-Cigarette/Vaping Substances     Social History     Tobacco Use   • Smoking status: Every Day     Packs/day: 0.50     Types: Cigarettes   • Smokeless tobacco: Never   Substance Use Topics   • Alcohol use: Not Currently   • Drug use: Yes     Types: Marijuana     Comment: daily       Review of Systems Constitutional: Negative for chills, diaphoresis, fatigue and fever. HENT: Negative for sore throat. Respiratory: Positive for sputum production and shortness of breath. Negative for cough, hemoptysis and wheezing. Cardiovascular: Negative for chest pain, palpitations, claudication and leg swelling. Gastrointestinal: Positive for abdominal pain. Negative for anorexia, blood in stool, constipation, diarrhea, hematemesis, hematochezia, nausea and vomiting. Genitourinary: Negative for decreased urine volume, dysuria and hematuria. Musculoskeletal: Negative for back pain. Skin: Negative for rash. Neurological: Negative for syncope, weakness and numbness. All other systems reviewed and are negative. Physical Exam  Physical Exam  Vitals and nursing note reviewed. Constitutional:       General: He is awake. He is not in acute distress. Appearance: Normal appearance. He is not ill-appearing, toxic-appearing or diaphoretic. HENT:      Head: Normocephalic. Mouth/Throat:      Lips: Pink. Mouth: Mucous membranes are moist.   Eyes:      General: Vision grossly intact. Pupils: Pupils are equal, round, and reactive to light. Cardiovascular:      Rate and Rhythm: Normal rate and regular rhythm. Heart sounds: Normal heart sounds. Pulmonary:      Effort: Pulmonary effort is normal. No respiratory distress. Breath sounds: No stridor. Rhonchi present. No wheezing or rales. Comments: Patient in no respiratory distress, speaking in full sentences, managing oral secretions without difficulty, no accessory muscle use, retractions, or belly breathing noted, no focal lung findings. Abdominal:      General: There is no distension. Palpations: Abdomen is soft. Tenderness: There is abdominal tenderness. There is no right CVA tenderness, left CVA tenderness or guarding. Musculoskeletal:      Right lower leg: No edema. Left lower leg: No edema.    Skin: General: Skin is warm and dry. Capillary Refill: Capillary refill takes less than 2 seconds. Neurological:      Mental Status: He is alert.       Gait: Gait normal.         Vital Signs  ED Triage Vitals   Temperature Pulse Respirations Blood Pressure SpO2   07/17/23 1633 07/17/23 2006 07/17/23 1633 07/17/23 1633 07/17/23 1633   98.1 °F (36.7 °C) 68 16 (!) 196/96 100 %      Temp src Heart Rate Source Patient Position - Orthostatic VS BP Location FiO2 (%)   -- -- -- -- --             Pain Score       07/17/23 1633       9           Vitals:    07/17/23 1633 07/17/23 1643 07/17/23 2006   BP: (!) 196/96 150/75 144/83   Pulse:   68         Visual Acuity      ED Medications  Medications   iohexol (OMNIPAQUE) 350 MG/ML injection (SINGLE-DOSE) 100 mL (100 mL Intravenous Given 7/17/23 1852)   albuterol (PROVENTIL HFA,VENTOLIN HFA) inhaler 2 puff (2 puffs Inhalation Given 7/17/23 2103)       Diagnostic Studies  Results Reviewed     Procedure Component Value Units Date/Time    HS Troponin I 2hr [[id number]]  (Normal) Collected: 07/1956    Lab Status: Final result Specimen: Blood from Arm, Right Updated: 07/17/23 2029     hs TnI 2hr 11 ng/L      Delta 2hr hsTnI 6 ng/L     Comprehensive metabolic panel [[id number]]  (Abnormal) Collected: 07/17/23 1749    Lab Status: Final result Specimen: Blood from Arm, Right Updated: 07/17/23 1832     Sodium 136 mmol/L      Potassium 4.8 mmol/L      Chloride 102 mmol/L      CO2 27 mmol/L      ANION GAP 7 mmol/L      BUN 14 mg/dL      Creatinine 0.87 mg/dL      Glucose 84 mg/dL      Calcium 9.6 mg/dL      AST 25 U/L      ALT 9 U/L      Alkaline Phosphatase 75 U/L      Total Protein 8.7 g/dL      Albumin 4.4 g/dL      Total Bilirubin 0.29 mg/dL      eGFR 89 ml/min/1.73sq m     Narrative:      [place] guidelines for Chronic Kidney Disease (CKD):   •  Stage 1 with normal or high GFR (GFR > 90 mL/min/1.73 square meters)  •  Stage 2 Mild CKD (GFR = 60-89 mL/min/1.73 square meters)  •  Stage 3A Moderate CKD (GFR = 45-59 mL/min/1.73 square meters)  •  Stage 3B Moderate CKD (GFR = 30-44 mL/min/1.73 square meters)  •  Stage 4 Severe CKD (GFR = 15-29 mL/min/1.73 square meters)  •  Stage 5 End Stage CKD (GFR <15 mL/min/1.73 square meters)  Note: GFR calculation is accurate only with a steady state creatinine    Lipase [[id number]]  (Normal) Collected: 07/17/23 1749    Lab Status: Final result Specimen: Blood from Arm, Right Updated: 07/17/23 1832     Lipase 48 u/L     HS Troponin 0hr (reflex protocol) [[id number]]  (Normal) Collected: 07/17/23 1749    Lab Status: Final result Specimen: Blood from Arm, Right Updated: 07/17/23 1824     hs TnI 0hr 5 ng/L     B-Type Natriuretic Peptide(BNP) [[id number]]  (Normal) Collected: 07/17/23 1749    Lab Status: Final result Specimen: Blood from Arm, Right Updated: 07/17/23 1822     BNP 67 pg/mL     CBC and differential [[id number]]  (Abnormal) Collected: 07/17/23 1749    Lab Status: Final result Specimen: Blood from Arm, Right Updated: 07/17/23 1802     WBC 7.74 Thousand/uL      RBC 4.51 Million/uL      Hemoglobin 12.2 g/dL      Hematocrit 39.8 %      MCV 88 fL      MCH 27.1 pg      MCHC 30.7 g/dL      RDW 14.1 %      MPV 9.1 fL      Platelets 213 Thousands/uL      nRBC 0 /100 WBCs      Neutrophils Relative 62 %      Immat GRANS % 0 %      Lymphocytes Relative 30 %      Monocytes Relative 5 %      Eosinophils Relative 2 %      Basophils Relative 1 %      Neutrophils Absolute 4.77 Thousands/µL      Immature Grans Absolute 0.01 Thousand/uL      Lymphocytes Absolute 2.32 Thousands/µL      Monocytes Absolute 0.42 Thousand/µL      Eosinophils Absolute 0.16 Thousand/µL      Basophils Absolute 0.06 Thousands/µL                  CT abdomen pelvis with contrast   Final Result by Tawana Vela MD (07/17 2020)      No acute intra-abdominal abnormality. Mild to moderate amount of stool noted throughout the colon.       Workstation performed: [id number]         XR chest 2 views   Final Result by Dmitri Sylvester MD (24/42 0378)      No acute cardiopulmonary disease. Findings are stable            Workstation performed: [id number]                    Procedures  Procedures         ED Course  ED Course as of 07/19/23 2222 Mon Jul 17, 2023   1750 Procedure Note: EKG  Date/Time: 07/17/23 5:51 PM   Performed by: Wesley Sat by: Justin Arce  ECG interpreted by me, the ED Provider: yes   The EKG demonstrates:  Rate 66 bpm  Rhythm NSR   QTc 415 ms   No ST elevations/depressions     2031 Delta 2hr hsTnI: 6   2044 Patient reports he really just wanted a refill of his albuterol inhaler. Denies current or past CP. Discussion had with patient regarding his troponin, for chest pain, possible observation/admission, he refuses, is requesting discharge. As patient is not currently having chest pain nor did he have chest pain and shortness of breath at rest is resolved low clinical suspicion at this time for ACS. Will discharge patient home with cardiology follow-up, strict return precautions. SBIRT 22yo+    Flowsheet Row Most Recent Value   Initial Alcohol Screen: US AUDIT-C     1. How often do you have a drink containing alcohol? 0 Filed at: 07/17/2023 1636   2. How many drinks containing alcohol do you have on a typical day you are drinking? 0 Filed at: 07/17/2023 1636   3a. Male UNDER 65: How often do you have five or more drinks on one occasion? 0 Filed at: 07/17/2023 1636   3b. FEMALE Any Age, or MALE 65+: How often do you have 4 or more drinks on one occassion? 0 Filed at: 07/17/2023 1636   Audit-C Score 0 Filed at: 07/17/2023 1636   GERMÁN: How many times in the past year have you. .. Used an illegal drug or used a prescription medication for non-medical reasons? Never Filed at: 07/17/2023 1636                    Medical Decision Making  RUQ pain and bulging. Tender on exam no masses.   No peritoneal signs. No leukocytosis. Hemoglobin within normal limits. Kidney function within normal limits. AST, ALT C, alk phos, total bilirubin within normal limits. Electrolytes within normal limits. CT abdomen pelvis with no acute intra-abdominal abnormality, moderate stool, discussed increased fiber intake with patient. Also c/o orthopnea for weeks and clear sputum production. No acute respiratory distress. No evidence of fluid overload. BNP within normal limits. Chest x-ray without acute cardiopulmonary disease on my wet read. ECG without acute ischemic changes or dysrhythmia. Patient reports he really just wanted a refill of his albuterol inhaler. Medication refilled. Denies current or past CP. Discussion had with patient regarding his troponin, for chest pain, possible observation/admission, he refuses, is requesting discharge. As patient is not currently having chest pain nor did he have chest pain and shortness of breath at rest is resolved low clinical suspicion at this time for ACS. Will discharge patient home with cardiology follow-up, strict return precautions. All imaging and/or lab testing discussed with patient, strict return to ED precautions discussed. Patient recommended to follow up promptly with appropriate outpatient provider. Patient and/or family members verbalizes understanding and agrees with plan. Patient and/or family members were given opportunity to ask questions, all questions were answered at this time. Patient is stable for discharge.     Portions of the record may have been created with voice recognition software. Occasional wrong word or "sound a like" substitutions may have occurred due to the inherent limitations of voice recognition software. Read the chart carefully and recognize, using context, where substitutions have occurred. Amount and/or Complexity of Data Reviewed  Labs: ordered. Decision-making details documented in ED Course.   Radiology: ordered. Risk  Prescription drug management. Disposition  Final diagnoses:   RUQ pain   Shortness of breath     Time reflects when diagnosis was documented in both MDM as applicable and the Disposition within this note     Time User Action Codes Description Comment    7/17/2023  8:47 PM Julio Cesar [employer]ste[employer] Add [R10.11] RUQ pain     7/17/2023  8:48 PM Julio Cesar Moshe Add [R06.02] Shortness of breath       ED Disposition     ED Disposition   Discharge    Condition   Stable    Date/Time   Mon Jul 17, 2023  8:47 PM    [address] [facility] discharge to home/self care. Follow-up Information     Follow up With Specialties Details Why Contact Info Additional Information    George Bumpers, MD Family Medicine Schedule an appointment as soon as possible for a visit  For follow up regarding your symptoms [address] [facility] [address]  [phone]       [facility]   [address] [facility] [address] [phone] [address] [place] [address], [place], [place], [address] [phone]          Discharge Medication List as of 7/17/2023  8:49 PM      START taking these medications    Details   albuterol (ProAir HFA) 90 mcg/act inhaler Inhale 2 puffs every 6 (six) hours as needed for wheezing, Starting Mon 7/17/2023, Normal         CONTINUE these medications which have NOT CHANGED    Details   meclizine (ANTIVERT) 25 mg tablet Take 1 tablet (25 mg total) by mouth 3 (three) times a day as needed for dizziness, Starting Tue 10/25/2022, Normal             No discharge procedures on file.     PDMP Review     None          ED Provider  Electronically Signed by           Lucrecia Palafox PA-C  07/19/23 1007

## 2023-12-18 ENCOUNTER — HOSPITAL ENCOUNTER (EMERGENCY)
Facility: HOSPITAL | Age: 68
Discharge: HOME | End: 2023-12-18
Attending: EMERGENCY MEDICINE | Admitting: EMERGENCY MEDICINE
Payer: MEDICARE

## 2023-12-18 ENCOUNTER — APPOINTMENT (EMERGENCY)
Dept: CT IMAGING | Facility: HOSPITAL | Age: 68
End: 2023-12-18
Payer: MEDICARE

## 2023-12-18 VITALS
DIASTOLIC BLOOD PRESSURE: 108 MMHG | OXYGEN SATURATION: 100 % | SYSTOLIC BLOOD PRESSURE: 197 MMHG | HEART RATE: 65 BPM | BODY MASS INDEX: 19.1 KG/M2 | RESPIRATION RATE: 16 BRPM | TEMPERATURE: 97.9 F | WEIGHT: 111.3 LBS

## 2023-12-18 DIAGNOSIS — S20.212A CONTUSION OF LEFT CHEST WALL, INITIAL ENCOUNTER: ICD-10-CM

## 2023-12-18 DIAGNOSIS — J20.9 ACUTE BRONCHITIS, UNSPECIFIED ORGANISM: Primary | ICD-10-CM

## 2023-12-18 DIAGNOSIS — R91.1 PULMONARY NODULE: ICD-10-CM

## 2023-12-18 LAB
ALBUMIN SERPL BCP-MCNC: 4.1 G/DL (ref 3.5–5)
ALP SERPL-CCNC: 66 U/L (ref 34–104)
ALT SERPL W P-5'-P-CCNC: 18 U/L (ref 7–52)
ANION GAP SERPL CALCULATED.3IONS-SCNC: 7 MMOL/L
AST SERPL W P-5'-P-CCNC: 28 U/L (ref 13–39)
ATRIAL RATE: 59 BPM
BASOPHILS # BLD AUTO: 0.07 THOUSANDS/ÂΜL (ref 0–0.1)
BASOPHILS NFR BLD AUTO: 1 % (ref 0–1)
BILIRUB SERPL-MCNC: 0.47 MG/DL (ref 0.2–1)
BUN SERPL-MCNC: 13 MG/DL (ref 5–25)
CALCIUM SERPL-MCNC: 8.9 MG/DL (ref 8.4–10.2)
CARDIAC TROPONIN I PNL SERPL HS: 3 NG/L
CHLORIDE SERPL-SCNC: 106 MMOL/L (ref 96–108)
CO2 SERPL-SCNC: 25 MMOL/L (ref 21–32)
CREAT SERPL-MCNC: 0.78 MG/DL (ref 0.6–1.3)
EOSINOPHIL # BLD AUTO: 0.32 THOUSAND/ÂΜL (ref 0–0.61)
EOSINOPHIL NFR BLD AUTO: 4 % (ref 0–6)
ERYTHROCYTE [DISTWIDTH] IN BLOOD BY AUTOMATED COUNT: 14.2 % (ref 11.6–15.1)
FLUAV RNA RESP QL NAA+PROBE: NEGATIVE
FLUBV RNA RESP QL NAA+PROBE: NEGATIVE
GFR SERPL CREATININE-BSD FRML MDRD: 93 ML/MIN/1.73SQ M
GLUCOSE SERPL-MCNC: 89 MG/DL (ref 65–140)
HCT VFR BLD AUTO: 39.1 % (ref 36.5–49.3)
HGB BLD-MCNC: 12.3 G/DL (ref 12–17)
IMM GRANULOCYTES # BLD AUTO: 0.02 THOUSAND/UL (ref 0–0.2)
IMM GRANULOCYTES NFR BLD AUTO: 0 % (ref 0–2)
LYMPHOCYTES # BLD AUTO: 2.36 THOUSANDS/ÂΜL (ref 0.6–4.47)
LYMPHOCYTES NFR BLD AUTO: 27 % (ref 14–44)
MCH RBC QN AUTO: 27.5 PG (ref 26.8–34.3)
MCHC RBC AUTO-ENTMCNC: 31.5 G/DL (ref 31.4–37.4)
MCV RBC AUTO: 88 FL (ref 82–98)
MONOCYTES # BLD AUTO: 0.5 THOUSAND/ÂΜL (ref 0.17–1.22)
MONOCYTES NFR BLD AUTO: 6 % (ref 4–12)
NEUTROPHILS # BLD AUTO: 5.33 THOUSANDS/ÂΜL (ref 1.85–7.62)
NEUTS SEG NFR BLD AUTO: 62 % (ref 43–75)
NRBC BLD AUTO-RTO: 0 /100 WBCS
P AXIS: 74 DEGREES
PLATELET # BLD AUTO: 301 THOUSANDS/UL (ref 149–390)
PMV BLD AUTO: 9.2 FL (ref 8.9–12.7)
POTASSIUM SERPL-SCNC: 3.9 MMOL/L (ref 3.5–5.3)
PR INTERVAL: 172 MS
PROT SERPL-MCNC: 7.3 G/DL (ref 6.4–8.4)
QRS AXIS: 58 DEGREES
QRSD INTERVAL: 84 MS
QT INTERVAL: 414 MS
QTC INTERVAL: 409 MS
RBC # BLD AUTO: 4.47 MILLION/UL (ref 3.88–5.62)
RSV RNA RESP QL NAA+PROBE: NEGATIVE
SARS-COV-2 RNA RESP QL NAA+PROBE: NEGATIVE
SODIUM SERPL-SCNC: 138 MMOL/L (ref 135–147)
T WAVE AXIS: 66 DEGREES
VENTRICULAR RATE: 59 BPM
WBC # BLD AUTO: 8.6 THOUSAND/UL (ref 4.31–10.16)

## 2023-12-18 PROCEDURE — 0241U HB NFCT DS VIR RESP RNA 4 TRGT: CPT | Performed by: PHYSICIAN ASSISTANT

## 2023-12-18 PROCEDURE — 36415 COLL VENOUS BLD VENIPUNCTURE: CPT | Performed by: PHYSICIAN ASSISTANT

## 2023-12-18 PROCEDURE — 84484 ASSAY OF TROPONIN QUANT: CPT | Performed by: PHYSICIAN ASSISTANT

## 2023-12-18 PROCEDURE — 74177 CT ABD & PELVIS W/CONTRAST: CPT

## 2023-12-18 PROCEDURE — 93005 ELECTROCARDIOGRAM TRACING: CPT

## 2023-12-18 PROCEDURE — 94640 AIRWAY INHALATION TREATMENT: CPT

## 2023-12-18 PROCEDURE — 71260 CT THORAX DX C+: CPT

## 2023-12-18 PROCEDURE — 99285 EMERGENCY DEPT VISIT HI MDM: CPT | Performed by: PHYSICIAN ASSISTANT

## 2023-12-18 PROCEDURE — G1004 CDSM NDSC: HCPCS

## 2023-12-18 PROCEDURE — 85025 COMPLETE CBC W/AUTO DIFF WBC: CPT | Performed by: PHYSICIAN ASSISTANT

## 2023-12-18 PROCEDURE — 99284 EMERGENCY DEPT VISIT MOD MDM: CPT

## 2023-12-18 PROCEDURE — 80053 COMPREHEN METABOLIC PANEL: CPT | Performed by: PHYSICIAN ASSISTANT

## 2023-12-18 RX ORDER — IPRATROPIUM BROMIDE AND ALBUTEROL SULFATE 2.5; .5 MG/3ML; MG/3ML
3 SOLUTION RESPIRATORY (INHALATION)
Status: DISCONTINUED | OUTPATIENT
Start: 2023-12-18 | End: 2023-12-18 | Stop reason: HOSPADM

## 2023-12-18 RX ORDER — AZITHROMYCIN 250 MG/1
TABLET, FILM COATED ORAL
Qty: 6 TABLET | Refills: 0 | Status: SHIPPED | OUTPATIENT
Start: 2023-12-18 | End: 2023-12-22

## 2023-12-18 RX ORDER — PREDNISONE 20 MG/1
40 TABLET ORAL DAILY
Qty: 10 TABLET | Refills: 0 | Status: SHIPPED | OUTPATIENT
Start: 2023-12-18 | End: 2023-12-23

## 2023-12-18 RX ADMIN — IPRATROPIUM BROMIDE AND ALBUTEROL SULFATE 3 ML: 2.5; .5 SOLUTION RESPIRATORY (INHALATION) at 13:50

## 2023-12-18 RX ADMIN — IOHEXOL 85 ML: 350 INJECTION, SOLUTION INTRAVENOUS at 13:29

## 2023-12-18 NOTE — ED PROVIDER NOTES
History  Chief Complaint   Patient presents with    Cold Like Symptoms     Runny nose, coughing alittle bit and pain in my ribs (I was throwing out garage and one of the bag came back out and hit me in the rib -L side). Reports SOB this morning. Denies fevers. Denies CP     68-year-old male presents the emergency department with complaints of shortness of breath.  States that he has had a slight runny nose and a cough in the past 1 to 2 days.  States that 3 days ago he was lifting a bag full of bottles while at work and the bag fell back and hit him in the left side of the ribs causing acute pain.  Still with pain in the left side of the chest and upper abdomen.  States that the bag weighed approximately 75 pounds and was filled with empty liquor bottles from the hotel.  He denies known fevers at home.  No known sick contacts.      History provided by:  Patient   used: No        Prior to Admission Medications   Prescriptions Last Dose Informant Patient Reported? Taking?   albuterol (ProAir HFA) 90 mcg/act inhaler   No No   Sig: Inhale 2 puffs every 6 (six) hours as needed for wheezing   meclizine (ANTIVERT) 25 mg tablet   No No   Sig: Take 1 tablet (25 mg total) by mouth 3 (three) times a day as needed for dizziness      Facility-Administered Medications: None       Past Medical History:   Diagnosis Date    Asthma        History reviewed. No pertinent surgical history.    History reviewed. No pertinent family history.  I have reviewed and agree with the history as documented.    E-Cigarette/Vaping     E-Cigarette/Vaping Substances     Social History     Tobacco Use    Smoking status: Every Day     Current packs/day: 0.50     Types: Cigarettes    Smokeless tobacco: Never   Substance Use Topics    Alcohol use: Not Currently    Drug use: Yes     Types: Marijuana     Comment: daily       Review of Systems   Constitutional:  Negative for activity change, appetite change, chills and fever.   HENT:   Positive for congestion. Negative for dental problem, drooling, ear discharge, ear pain, mouth sores, nosebleeds, rhinorrhea, sore throat and trouble swallowing.    Eyes:  Negative for pain, discharge and itching.   Respiratory:  Positive for shortness of breath. Negative for cough, chest tightness and wheezing.    Cardiovascular:  Positive for chest pain. Negative for palpitations.   Gastrointestinal:  Negative for abdominal pain, blood in stool, constipation, diarrhea, nausea and vomiting.   Endocrine: Negative for cold intolerance and heat intolerance.   Genitourinary:  Negative for difficulty urinating, dysuria, flank pain, frequency and urgency.   Skin:  Negative for rash and wound.   Allergic/Immunologic: Negative for food allergies and immunocompromised state.   Neurological:  Negative for dizziness, seizures, syncope, weakness, numbness and headaches.   Psychiatric/Behavioral:  Negative for agitation, behavioral problems and confusion.        Physical Exam  Physical Exam  Vitals and nursing note reviewed.   Constitutional:       General: He is not in acute distress.     Appearance: He is not diaphoretic.   HENT:      Head: Normocephalic and atraumatic.      Right Ear: External ear normal.      Left Ear: External ear normal.      Mouth/Throat:      Mouth: Mucous membranes are moist.   Eyes:      Conjunctiva/sclera: Conjunctivae normal.   Neck:      Vascular: No JVD.      Trachea: No tracheal deviation.   Cardiovascular:      Rate and Rhythm: Normal rate and regular rhythm.      Heart sounds: Normal heart sounds. No murmur heard.     No friction rub. No gallop.   Pulmonary:      Effort: Pulmonary effort is normal. No respiratory distress.      Breath sounds: Wheezing and rhonchi present. No rales.   Chest:      Chest wall: Tenderness (left lower anterir and lateral chest wall.) present. No crepitus.      Comments: No ecchymosis  Abdominal:      General: Bowel sounds are normal. There is no distension.       Palpations: Abdomen is soft.      Tenderness: There is abdominal tenderness in the left upper quadrant. There is no guarding.   Musculoskeletal:         General: No tenderness or deformity. Normal range of motion.   Lymphadenopathy:      Cervical: No cervical adenopathy.   Skin:     General: Skin is warm and dry.      Findings: No erythema or rash.   Neurological:      General: No focal deficit present.      Mental Status: He is alert and oriented to person, place, and time.   Psychiatric:         Mood and Affect: Mood normal.         Behavior: Behavior normal.         Vital Signs  ED Triage Vitals [12/18/23 1052]   Temperature Pulse Respirations Blood Pressure SpO2   97.9 °F (36.6 °C) 65 16 (!) 197/108 100 %      Temp Source Heart Rate Source Patient Position - Orthostatic VS BP Location FiO2 (%)   Oral Monitor Sitting Left arm --      Pain Score       --           Vitals:    12/18/23 1052   BP: (!) 197/108   Pulse: 65   Patient Position - Orthostatic VS: Sitting         Visual Acuity      ED Medications  Medications   ipratropium-albuterol (DUO-NEB) 0.5-2.5 mg/3 mL inhalation solution 3 mL (3 mL Nebulization Given 12/18/23 1350)   iohexol (OMNIPAQUE) 350 MG/ML injection (MULTI-DOSE) 85 mL (85 mL Intravenous Given 12/18/23 1329)       Diagnostic Studies  Results Reviewed       Procedure Component Value Units Date/Time    HS Troponin 0hr (reflex protocol) [[id number]]  (Normal) Collected: 12/18/23 1202    Lab Status: Final result Specimen: Blood from Arm, Right Updated: 12/18/23 1244     hs TnI 0hr 3 ng/L     HS Troponin I 2hr [[id number]]     Lab Status: No result Specimen: Blood     HS Troponin I 4hr [[id number]]     Lab Status: No result Specimen: Blood     Comprehensive metabolic panel [[id number]] Collected: 12/18/23 1202    Lab Status: Final result Specimen: Blood from Arm, Right Updated: 12/18/23 1233     Sodium 138 mmol/L      Potassium 3.9 mmol/L      Chloride 106 mmol/L      CO2 25 mmol/L      ANION GAP 7  mmol/L      BUN 13 mg/dL      Creatinine 0.78 mg/dL      Glucose 89 mg/dL      Calcium 8.9 mg/dL      AST 28 U/L      ALT 18 U/L      Alkaline Phosphatase 66 U/L      Total Protein 7.3 g/dL      Albumin 4.1 g/dL      Total Bilirubin 0.47 mg/dL      eGFR 93 ml/min/1.73sq m     Narrative:      National Kidney Disease Foundation guidelines for Chronic Kidney Disease (CKD):     Stage 1 with normal or high GFR (GFR > 90 mL/min/1.73 square meters)    Stage 2 Mild CKD (GFR = 60-89 mL/min/1.73 square meters)    Stage 3A Moderate CKD (GFR = 45-59 mL/min/1.73 square meters)    Stage 3B Moderate CKD (GFR = 30-44 mL/min/1.73 square meters)    Stage 4 Severe CKD (GFR = 15-29 mL/min/1.73 square meters)    Stage 5 End Stage CKD (GFR <15 mL/min/1.73 square meters)  Note: GFR calculation is accurate only with a steady state creatinine    CBC and differential [[id number]] Collected: 12/18/23 1202    Lab Status: Final result Specimen: Blood from Arm, Right Updated: 12/18/23 1211     WBC 8.60 Thousand/uL      RBC 4.47 Million/uL      Hemoglobin 12.3 g/dL      Hematocrit 39.1 %      MCV 88 fL      MCH 27.5 pg      MCHC 31.5 g/dL      RDW 14.2 %      MPV 9.2 fL      Platelets 301 Thousands/uL      nRBC 0 /100 WBCs      Neutrophils Relative 62 %      Immat GRANS % 0 %      Lymphocytes Relative 27 %      Monocytes Relative 6 %      Eosinophils Relative 4 %      Basophils Relative 1 %      Neutrophils Absolute 5.33 Thousands/µL      Immature Grans Absolute 0.02 Thousand/uL      Lymphocytes Absolute 2.36 Thousands/µL      Monocytes Absolute 0.50 Thousand/µL      Eosinophils Absolute 0.32 Thousand/µL      Basophils Absolute 0.07 Thousands/µL     FLU/RSV/COVID - if FLU/RSV clinically relevant [[id number]] Collected: 12/18/23 1202    Lab Status: In process Specimen: Nares from Nose Updated: 12/18/23 1208                   CT chest abdomen pelvis w contrast   Final Result by Zeenat Boyle MD (12/18 1427)      CT CHEST:      1) No acute  fractures.      2) Moderate diffuse bronchial wall thickening with scattered areas of mucous plugging, suggestive of bronchitis.      3) Mild to moderate paraseptal emphysematous changes.      4) Nonspecific 3 mm left lower lobe pulmonary nodule. Based on current Fleischner Society 2017 Guidelines on incidental pulmonary nodules, no routine follow-up is needed if the patient is low risk.  If the patient is high risk, optional follow-up chest    CT in 12 months can be considered.      5) Mild mediastinal and bilateral hilar lymphadenopathy, right greater than left, likely reactive.      CT ABDOMEN/PELVIS:      6) Circumferential wall thickening of several loops of proximal jejunum in left hemiabdomen, which may be related to underdistention versus enteritis.      7) No other acute abdominal or pelvic pathology.      8) Additional findings as above.                  Workstation performed: [id number]                    Procedures  ECG 12 Lead Documentation Only    Date/Time: 12/18/2023 11:56 AM    Performed by: Keya Leigh PA-C  Authorized by: Keya Leigh PA-C    Indications / Diagnosis:  SOB  ECG reviewed by me, the ED Provider: yes    Patient location:  ED  Interpretation:     Interpretation: non-specific    Rate:     ECG rate:  60    ECG rate assessment: normal    Rhythm:     Rhythm: sinus rhythm    Ectopy:     Ectopy: none    QRS:     QRS axis:  Normal    QRS intervals:  Normal  Conduction:     Conduction: normal    ST segments:     ST segments:  Normal  T waves:     T waves: normal             ED Course             HEART Risk Score      Flowsheet Row Most Recent Value   Heart Score Risk Calculator    History 0 Filed at: 12/18/2023 1244   ECG 0 Filed at: 12/18/2023 1244   Age 2 Filed at: 12/18/2023 1244   Risk Factors 1 Filed at: 12/18/2023 1244   Troponin 0 Filed at: 12/18/2023 1244   HEART Score 3 Filed at: 12/18/2023 1244                                        Medical Decision Making  Differential  diagnosis includes but not limited to: Chest wall pain, rib fracture, pulmonary contusion, abdominal injury, COPD exacerbation, pneumonia, COVID, influenza    Problems Addressed:  Acute bronchitis, unspecified organism: acute illness or injury  Contusion of left chest wall, initial encounter: acute illness or injury  Pulmonary nodule: acute illness or injury    Amount and/or Complexity of Data Reviewed  Labs: ordered. Decision-making details documented in ED Course.  Radiology: ordered. Decision-making details documented in ED Course.    Risk  Prescription drug management.             Disposition  Final diagnoses:   Acute bronchitis, unspecified organism   Contusion of left chest wall, initial encounter   Pulmonary nodule     Time reflects when diagnosis was documented in both MDM as applicable and the Disposition within this note       Time User Action Codes Description Comment    12/18/2023  2:39 PM Keya Leigh Add [J20.9] Acute bronchitis, unspecified organism     12/18/2023  2:40 PM Keya Leigh Add [S20.212A] Contusion of left chest wall, initial encounter     12/18/2023  2:40 PM Keya Leigh Add [R91.1] Pulmonary nodule           ED Disposition       ED Disposition   Discharge    Condition   Stable    Date/Time   Mon Dec 18, 2023 1439    Comment   Ham Kline discharge to home/self care.                   Follow-up Information    None         Patient's Medications   Discharge Prescriptions    AZITHROMYCIN (ZITHROMAX) 250 MG TABLET    Take 2 tablets today then 1 tablet daily x 4 days       Start Date: 12/18/2023End Date: 12/22/2023       Order Dose: --       Quantity: 6 tablet    Refills: 0    PREDNISONE 20 MG TABLET    Take 2 tablets (40 mg total) by mouth daily for 5 days       Start Date: 12/18/2023End Date: 12/23/2023       Order Dose: 40 mg       Quantity: 10 tablet    Refills: 0       No discharge procedures on file.    PDMP Review       None            ED Provider  Electronically Signed  by             Keya Leigh PA-C  12/18/23 9062

## 2023-12-18 NOTE — DISCHARGE INSTRUCTIONS
Continue to use your inhaler: 2 puffs every 4 hours while awake for the next 5 days.  Your CT is showing a pulmonary nodule on the left side.  Please follow-up with your family physician for additional imaging as needed.

## 2024-06-24 ENCOUNTER — HOSPITAL ENCOUNTER (EMERGENCY)
Facility: HOSPITAL | Age: 69
Discharge: HOME | End: 2024-06-24
Attending: STUDENT IN AN ORGANIZED HEALTH CARE EDUCATION/TRAINING PROGRAM | Admitting: STUDENT IN AN ORGANIZED HEALTH CARE EDUCATION/TRAINING PROGRAM
Payer: COMMERCIAL

## 2024-06-24 ENCOUNTER — APPOINTMENT (EMERGENCY)
Dept: RADIOLOGY | Facility: HOSPITAL | Age: 69
End: 2024-06-24
Payer: COMMERCIAL

## 2024-06-24 VITALS
SYSTOLIC BLOOD PRESSURE: 159 MMHG | RESPIRATION RATE: 18 BRPM | BODY MASS INDEX: 19.15 KG/M2 | HEART RATE: 75 BPM | WEIGHT: 111.55 LBS | DIASTOLIC BLOOD PRESSURE: 88 MMHG | TEMPERATURE: 98 F | OXYGEN SATURATION: 100 %

## 2024-06-24 DIAGNOSIS — S62.309A METACARPAL BONE FRACTURE: Primary | ICD-10-CM

## 2024-06-24 PROCEDURE — 29125 APPL SHORT ARM SPLINT STATIC: CPT

## 2024-06-24 PROCEDURE — 73130 X-RAY EXAM OF HAND: CPT

## 2024-06-24 PROCEDURE — 99284 EMERGENCY DEPT VISIT MOD MDM: CPT

## 2024-06-24 RX ORDER — NAPROXEN 500 MG/1
500 TABLET ORAL 2 TIMES DAILY WITH MEALS
Qty: 10 TABLET | Refills: 0 | Status: SHIPPED | OUTPATIENT
Start: 2024-06-24 | End: 2025-06-24

## 2024-06-24 RX ORDER — IBUPROFEN 600 MG/1
600 TABLET ORAL ONCE
Status: COMPLETED | OUTPATIENT
Start: 2024-06-24 | End: 2024-06-24

## 2024-06-24 RX ORDER — SENNOSIDES 8.6 MG
650 CAPSULE ORAL EVERY 8 HOURS PRN
Qty: 30 TABLET | Refills: 0 | Status: SHIPPED | OUTPATIENT
Start: 2024-06-24

## 2024-06-24 RX ADMIN — IBUPROFEN 600 MG: 600 TABLET, FILM COATED ORAL at 18:54

## 2024-06-24 NOTE — Clinical Note
Ham Kline was seen and treated in our emergency department on 6/24/2024.            wear splint, do not use right hand until cleared by Orthopedics    Diagnosis: hand fracture    Ham  .    He may return on this date:          If you have any questions or concerns, please don't hesitate to call.      Cheyanne Benites PA-C    ______________________________           _______________          _______________  Hospital Representative                              Date                                Time
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
## 2024-06-24 NOTE — DISCHARGE INSTRUCTIONS
Do not get splint wet.  Keep splint on until follow-up with orthopedics.  Follow-up with orthopedics.  Rest ice elevation.  Return to ED for new or worsening symptoms as discussed.

## 2024-12-12 ENCOUNTER — APPOINTMENT (EMERGENCY)
Dept: CT IMAGING | Facility: HOSPITAL | Age: 69
DRG: 917 | End: 2024-12-12
Payer: MEDICARE

## 2024-12-12 ENCOUNTER — HOSPITAL ENCOUNTER (INPATIENT)
Facility: HOSPITAL | Age: 69
LOS: 1 days | Discharge: HOME | DRG: 917 | End: 2024-12-13
Attending: EMERGENCY MEDICINE | Admitting: INTERNAL MEDICINE
Payer: MEDICARE

## 2024-12-12 DIAGNOSIS — G93.40 ENCEPHALOPATHY ACUTE: ICD-10-CM

## 2024-12-12 DIAGNOSIS — R41.82 ALTERED MENTAL STATUS: ICD-10-CM

## 2024-12-12 DIAGNOSIS — F19.10 SUBSTANCE ABUSE (HCC): Primary | ICD-10-CM

## 2024-12-12 DIAGNOSIS — R45.1 AGITATION: ICD-10-CM

## 2024-12-12 PROBLEM — R74.8 ELEVATED CK: Status: ACTIVE | Noted: 2024-12-12

## 2024-12-12 PROBLEM — G92.9 TOXIC ENCEPHALOPATHY: Status: ACTIVE | Noted: 2024-12-12

## 2024-12-12 LAB
ALBUMIN SERPL BCG-MCNC: 3.7 G/DL (ref 3.5–5)
ALP SERPL-CCNC: 60 U/L (ref 34–104)
ALT SERPL W P-5'-P-CCNC: 11 U/L (ref 7–52)
AMPHETAMINES SERPL QL SCN: NEGATIVE
ANION GAP SERPL CALCULATED.3IONS-SCNC: 11 MMOL/L (ref 4–13)
AST SERPL W P-5'-P-CCNC: 30 U/L (ref 13–39)
ATRIAL RATE: 88 BPM
BARBITURATES UR QL: NEGATIVE
BASOPHILS # BLD AUTO: 0.05 THOUSANDS/ÂΜL (ref 0–0.1)
BASOPHILS NFR BLD AUTO: 1 % (ref 0–1)
BENZODIAZ UR QL: NEGATIVE
BILIRUB SERPL-MCNC: 0.24 MG/DL (ref 0.2–1)
BUN SERPL-MCNC: 11 MG/DL (ref 5–25)
CALCIUM SERPL-MCNC: 8.3 MG/DL (ref 8.4–10.2)
CHLORIDE SERPL-SCNC: 105 MMOL/L (ref 96–108)
CK SERPL-CCNC: 429 U/L (ref 39–308)
CO2 SERPL-SCNC: 23 MMOL/L (ref 21–32)
COCAINE UR QL: POSITIVE
CREAT SERPL-MCNC: 0.7 MG/DL (ref 0.6–1.3)
EOSINOPHIL # BLD AUTO: 0.14 THOUSAND/ÂΜL (ref 0–0.61)
EOSINOPHIL NFR BLD AUTO: 2 % (ref 0–6)
ERYTHROCYTE [DISTWIDTH] IN BLOOD BY AUTOMATED COUNT: 13.7 % (ref 11.6–15.1)
ETHANOL SERPL-MCNC: <10 MG/DL
FENTANYL UR QL SCN: POSITIVE
GFR SERPL CREATININE-BSD FRML MDRD: 96 ML/MIN/1.73SQ M
GLUCOSE SERPL-MCNC: 107 MG/DL (ref 65–140)
GLUCOSE SERPL-MCNC: 107 MG/DL (ref 65–140)
HCT VFR BLD AUTO: 33.4 % (ref 36.5–49.3)
HGB BLD-MCNC: 10.5 G/DL (ref 12–17)
HYDROCODONE UR QL SCN: NEGATIVE
IMM GRANULOCYTES # BLD AUTO: 0.03 THOUSAND/UL (ref 0–0.2)
IMM GRANULOCYTES NFR BLD AUTO: 0 % (ref 0–2)
LYMPHOCYTES # BLD AUTO: 1.01 THOUSANDS/ÂΜL (ref 0.6–4.47)
LYMPHOCYTES NFR BLD AUTO: 12 % (ref 14–44)
MCH RBC QN AUTO: 28.5 PG (ref 26.8–34.3)
MCHC RBC AUTO-ENTMCNC: 31.4 G/DL (ref 31.4–37.4)
MCV RBC AUTO: 91 FL (ref 82–98)
METHADONE UR QL: NEGATIVE
MONOCYTES # BLD AUTO: 0.41 THOUSAND/ÂΜL (ref 0.17–1.22)
MONOCYTES NFR BLD AUTO: 5 % (ref 4–12)
NEUTROPHILS # BLD AUTO: 6.76 THOUSANDS/ÂΜL (ref 1.85–7.62)
NEUTS SEG NFR BLD AUTO: 80 % (ref 43–75)
NRBC BLD AUTO-RTO: 0 /100 WBCS
OPIATES UR QL SCN: POSITIVE
OXYCODONE+OXYMORPHONE UR QL SCN: NEGATIVE
P AXIS: 79 DEGREES
PCP UR QL: NEGATIVE
PLATELET # BLD AUTO: 269 THOUSANDS/UL (ref 149–390)
PMV BLD AUTO: 8.8 FL (ref 8.9–12.7)
POTASSIUM SERPL-SCNC: 3.6 MMOL/L (ref 3.5–5.3)
PR INTERVAL: 166 MS
PROT SERPL-MCNC: 7.2 G/DL (ref 6.4–8.4)
QRS AXIS: 34 DEGREES
QRSD INTERVAL: 82 MS
QT INTERVAL: 372 MS
QTC INTERVAL: 451 MS
RBC # BLD AUTO: 3.69 MILLION/UL (ref 3.88–5.62)
SODIUM SERPL-SCNC: 139 MMOL/L (ref 135–147)
T WAVE AXIS: 71 DEGREES
THC UR QL: POSITIVE
VENTRICULAR RATE: 88 BPM
WBC # BLD AUTO: 8.4 THOUSAND/UL (ref 4.31–10.16)

## 2024-12-12 PROCEDURE — 94644 CONT INHLJ TX 1ST HOUR: CPT

## 2024-12-12 PROCEDURE — 96372 THER/PROPH/DIAG INJ SC/IM: CPT

## 2024-12-12 PROCEDURE — 82077 ASSAY SPEC XCP UR&BREATH IA: CPT | Performed by: EMERGENCY MEDICINE

## 2024-12-12 PROCEDURE — 85025 COMPLETE CBC W/AUTO DIFF WBC: CPT | Performed by: EMERGENCY MEDICINE

## 2024-12-12 PROCEDURE — 70450 CT HEAD/BRAIN W/O DYE: CPT

## 2024-12-12 PROCEDURE — 99223 1ST HOSP IP/OBS HIGH 75: CPT | Performed by: INTERNAL MEDICINE

## 2024-12-12 PROCEDURE — 99285 EMERGENCY DEPT VISIT HI MDM: CPT | Performed by: EMERGENCY MEDICINE

## 2024-12-12 PROCEDURE — 36415 COLL VENOUS BLD VENIPUNCTURE: CPT | Performed by: EMERGENCY MEDICINE

## 2024-12-12 PROCEDURE — 94760 N-INVAS EAR/PLS OXIMETRY 1: CPT

## 2024-12-12 PROCEDURE — 80307 DRUG TEST PRSMV CHEM ANLYZR: CPT | Performed by: EMERGENCY MEDICINE

## 2024-12-12 PROCEDURE — 93005 ELECTROCARDIOGRAM TRACING: CPT

## 2024-12-12 PROCEDURE — 99285 EMERGENCY DEPT VISIT HI MDM: CPT

## 2024-12-12 PROCEDURE — 93010 ELECTROCARDIOGRAM REPORT: CPT | Performed by: INTERNAL MEDICINE

## 2024-12-12 PROCEDURE — 80053 COMPREHEN METABOLIC PANEL: CPT | Performed by: EMERGENCY MEDICINE

## 2024-12-12 PROCEDURE — 82948 REAGENT STRIP/BLOOD GLUCOSE: CPT

## 2024-12-12 PROCEDURE — 96361 HYDRATE IV INFUSION ADD-ON: CPT

## 2024-12-12 PROCEDURE — 96374 THER/PROPH/DIAG INJ IV PUSH: CPT

## 2024-12-12 PROCEDURE — 82550 ASSAY OF CK (CPK): CPT | Performed by: EMERGENCY MEDICINE

## 2024-12-12 RX ORDER — SODIUM CHLORIDE FOR INHALATION 0.9 %
VIAL, NEBULIZER (ML) INHALATION
Status: DISPENSED
Start: 2024-12-12 | End: 2024-12-13

## 2024-12-12 RX ORDER — ONDANSETRON 2 MG/ML
4 INJECTION INTRAMUSCULAR; INTRAVENOUS EVERY 4 HOURS PRN
Status: DISCONTINUED | OUTPATIENT
Start: 2024-12-12 | End: 2024-12-13 | Stop reason: HOSPADM

## 2024-12-12 RX ORDER — ATORVASTATIN CALCIUM 20 MG/1
40 TABLET, FILM COATED ORAL DAILY
Status: DISCONTINUED | OUTPATIENT
Start: 2024-12-13 | End: 2024-12-13 | Stop reason: HOSPADM

## 2024-12-12 RX ORDER — SODIUM CHLORIDE FOR INHALATION 0.9 %
12 VIAL, NEBULIZER (ML) INHALATION ONCE
Status: COMPLETED | OUTPATIENT
Start: 2024-12-12 | End: 2024-12-12

## 2024-12-12 RX ORDER — DEXTROSE MONOHYDRATE AND SODIUM CHLORIDE 5; .9 G/100ML; G/100ML
100 INJECTION, SOLUTION INTRAVENOUS CONTINUOUS
Status: DISCONTINUED | OUTPATIENT
Start: 2024-12-12 | End: 2024-12-13 | Stop reason: HOSPADM

## 2024-12-12 RX ORDER — KETAMINE HYDROCHLORIDE 100 MG/ML
5 INJECTION, SOLUTION INTRAMUSCULAR; INTRAVENOUS ONCE
Status: COMPLETED | OUTPATIENT
Start: 2024-12-12 | End: 2024-12-12

## 2024-12-12 RX ORDER — LORAZEPAM 2 MG/ML
1 INJECTION INTRAMUSCULAR EVERY 4 HOURS PRN
Status: DISCONTINUED | OUTPATIENT
Start: 2024-12-12 | End: 2024-12-13 | Stop reason: HOSPADM

## 2024-12-12 RX ORDER — FLUTICASONE FUROATE AND VILANTEROL 100; 25 UG/1; UG/1
1 POWDER RESPIRATORY (INHALATION) DAILY
Status: DISCONTINUED | OUTPATIENT
Start: 2024-12-13 | End: 2024-12-13 | Stop reason: HOSPADM

## 2024-12-12 RX ORDER — ASPIRIN 81 MG/1
81 TABLET, COATED ORAL DAILY
COMMUNITY
Start: 2024-11-18

## 2024-12-12 RX ORDER — KETAMINE HYDROCHLORIDE 100 MG/ML
INJECTION, SOLUTION INTRAMUSCULAR; INTRAVENOUS
Status: COMPLETED
Start: 2024-12-12 | End: 2024-12-12

## 2024-12-12 RX ORDER — FLUTICASONE PROPIONATE AND SALMETEROL 113; 14 UG/1; UG/1
1 POWDER, METERED RESPIRATORY (INHALATION) 2 TIMES DAILY
COMMUNITY
Start: 2024-11-18

## 2024-12-12 RX ORDER — ALBUTEROL SULFATE 5 MG/ML
10 SOLUTION RESPIRATORY (INHALATION) ONCE
Status: COMPLETED | OUTPATIENT
Start: 2024-12-12 | End: 2024-12-12

## 2024-12-12 RX ORDER — NICOTINE 21 MG/24HR
1 PATCH, TRANSDERMAL 24 HOURS TRANSDERMAL DAILY
Status: DISCONTINUED | OUTPATIENT
Start: 2024-12-13 | End: 2024-12-13 | Stop reason: HOSPADM

## 2024-12-12 RX ORDER — ACETAMINOPHEN 325 MG/1
650 TABLET ORAL EVERY 4 HOURS PRN
Status: DISCONTINUED | OUTPATIENT
Start: 2024-12-12 | End: 2024-12-13 | Stop reason: HOSPADM

## 2024-12-12 RX ORDER — LORAZEPAM 2 MG/ML
2 INJECTION INTRAMUSCULAR ONCE
Status: COMPLETED | OUTPATIENT
Start: 2024-12-12 | End: 2024-12-12

## 2024-12-12 RX ORDER — KETAMINE HYDROCHLORIDE 50 MG/ML
5 INJECTION, SOLUTION INTRAMUSCULAR; INTRAVENOUS ONCE
Status: COMPLETED | OUTPATIENT
Start: 2024-12-12 | End: 2024-12-12

## 2024-12-12 RX ORDER — HALOPERIDOL 5 MG/ML
5 INJECTION INTRAMUSCULAR ONCE
Status: COMPLETED | OUTPATIENT
Start: 2024-12-12 | End: 2024-12-12

## 2024-12-12 RX ORDER — ATORVASTATIN CALCIUM 40 MG/1
40 TABLET, FILM COATED ORAL DAILY
COMMUNITY
Start: 2024-10-04

## 2024-12-12 RX ORDER — ALBUTEROL SULFATE 90 UG/1
2 INHALANT RESPIRATORY (INHALATION) EVERY 6 HOURS PRN
Status: DISCONTINUED | OUTPATIENT
Start: 2024-12-12 | End: 2024-12-13 | Stop reason: HOSPADM

## 2024-12-12 RX ORDER — HEPARIN SODIUM 5000 [USP'U]/ML
5000 INJECTION, SOLUTION INTRAVENOUS; SUBCUTANEOUS EVERY 8 HOURS SCHEDULED
Status: DISCONTINUED | OUTPATIENT
Start: 2024-12-12 | End: 2024-12-12

## 2024-12-12 RX ORDER — BENZTROPINE MESYLATE 1 MG/ML
1 INJECTION, SOLUTION INTRAMUSCULAR; INTRAVENOUS ONCE
Status: COMPLETED | OUTPATIENT
Start: 2024-12-12 | End: 2024-12-12

## 2024-12-12 RX ORDER — ASPIRIN 81 MG/1
81 TABLET ORAL DAILY
Status: DISCONTINUED | OUTPATIENT
Start: 2024-12-13 | End: 2024-12-13 | Stop reason: HOSPADM

## 2024-12-12 RX ORDER — ENOXAPARIN SODIUM 100 MG/ML
40 INJECTION SUBCUTANEOUS
Status: DISCONTINUED | OUTPATIENT
Start: 2024-12-13 | End: 2024-12-13 | Stop reason: HOSPADM

## 2024-12-12 RX ORDER — LORAZEPAM 2 MG/ML
2 INJECTION INTRAMUSCULAR ONCE
Status: DISCONTINUED | OUTPATIENT
Start: 2024-12-12 | End: 2024-12-12

## 2024-12-12 RX ADMIN — SODIUM CHLORIDE 1000 ML: 0.9 INJECTION, SOLUTION INTRAVENOUS at 06:06

## 2024-12-12 RX ADMIN — IPRATROPIUM BROMIDE 1 MG: 0.5 SOLUTION RESPIRATORY (INHALATION) at 14:53

## 2024-12-12 RX ADMIN — KETAMINE HYDROCHLORIDE 260 MG: 50 INJECTION INTRAMUSCULAR; INTRAVENOUS at 05:45

## 2024-12-12 RX ADMIN — KETAMINE HYDROCHLORIDE 260 MG: 100 INJECTION, SOLUTION, CONCENTRATE INTRAMUSCULAR; INTRAVENOUS at 05:42

## 2024-12-12 RX ADMIN — LORAZEPAM 2 MG: 2 INJECTION INTRAMUSCULAR; INTRAVENOUS at 04:41

## 2024-12-12 RX ADMIN — ALBUTEROL SULFATE 10 MG: 2.5 SOLUTION RESPIRATORY (INHALATION) at 14:54

## 2024-12-12 RX ADMIN — LORAZEPAM 2 MG: 2 INJECTION INTRAMUSCULAR; INTRAVENOUS at 09:28

## 2024-12-12 RX ADMIN — HALOPERIDOL LACTATE 5 MG: 5 INJECTION, SOLUTION INTRAMUSCULAR at 05:19

## 2024-12-12 RX ADMIN — KETAMINE HYDROCHLORIDE 260 MG: 100 INJECTION, SOLUTION INTRAMUSCULAR; INTRAVENOUS at 05:42

## 2024-12-12 RX ADMIN — ISODIUM CHLORIDE 12 ML: 0.03 SOLUTION RESPIRATORY (INHALATION) at 14:54

## 2024-12-12 RX ADMIN — DEXTROSE AND SODIUM CHLORIDE 100 ML/HR: 5; .9 INJECTION, SOLUTION INTRAVENOUS at 17:18

## 2024-12-12 RX ADMIN — BENZTROPINE MESYLATE 1 MG: 1 INJECTION INTRAMUSCULAR; INTRAVENOUS at 05:19

## 2024-12-12 NOTE — ED NOTES
Pts wife left - states she will be back then. 1:1 at doorway. Occasional restlessness noted.      Eliane Boyd RN  12/12/24 5766

## 2024-12-12 NOTE — ED NOTES
Spoke to pts daughter regarding plan for pt. Pts daughter did inform this nurse that on top of the suboxone they gave him, they gave him some seroquel bc they thought he was having an anxiety attack. Family at bedside. 1:1 at doorway. Pt continues to thrash and be restless     Eliane Boyd RN  12/12/24 0755       Eliane Boyd RN  12/12/24 0755

## 2024-12-12 NOTE — ED NOTES
Concern for patient safety due to patient standing on bed with uncontrolled bodily movements. Patient unable to be redirected by multiple staff members. Medications administered per provider order.      April Turk RN  12/12/24 0411

## 2024-12-12 NOTE — ED CARE HANDOFF
"Emergency Department Sign Out Note        Sign out and transfer of care from Dr. Barba. See Separate Emergency Department note.     The patient, Ham Kline, was evaluated by the previous provider for drug use, agitation, violence, AMS.    Workup Completed:  See previous    ED Course / Workup Pending (followup):  Awaiting sobriety and improvement of mental status            ED Course as of 12/12/24 1527   Thu Dec 12, 2024   0981 Patient appears agitated and restless on exam.  Still altered and unable to answer questions.  Family requesting medication to help calm him down. Ativan ordered and given   1005 I had a long discussion with the patient's wife and pt's daughter Palma.  Wife reports satisfaction with his care.  She is in agreement with plan to help him through this agitation while awaiting sobriety. Palma, a family member, expresses dissatisfaction with this care.  She is both angry that medication was not given and she is angry that medication is given.  She states, \"For all we know you might be injecting him with nothing. \"  She tells the patient's wife, \"you have to watch the staff the whole time, do not leave him alone.\"  I assured Palma that we do not inject \"nothing\" into the IV.  Palma goes back and forth whether wanting medication or not wanting medication.  Patient's wife is his emergency contact and will be making the decisions on his behalf.  Palma also expresses anger that not every tech and nurse introduced themselves to her before they introduced themselves to the patient.   1129 Patient resting in bed, continues to have restless movement although improved from previous exam.  No family at bedside, one-to-one tech in the room.   1231 Patient resting much more comfortably in bed, no restless movement   1250 I updated Palma by phone.  Patient's wife is now at bedside.  Patient continues to rest comfortably   1427 Patient continues to sleep comfortably.   1455 Patient examined, noted to " be wheezing, heart neb ordered, known history of asthma   1509 Palma called again I updated her on by phone   1526 I discussed patient with Dr. Bashir, he will be admitted to inpatient service for continued monitoring for his encephalopathy     CriticalCare Time    Date/Time: 12/12/2024 3:27 PM    Performed by: Debbie Garvin MD  Authorized by: Debbie Garvin MD    Critical care provider statement:     Critical care time (minutes):  35    Critical care time was exclusive of:  Separately billable procedures and treating other patients and teaching time    Critical care was necessary to treat or prevent imminent or life-threatening deterioration of the following conditions:  Respiratory failure and CNS failure or compromise    Critical care was time spent personally by me on the following activities:  Examination of patient, evaluation of patient's response to treatment, development of treatment plan with patient or surrogate, ordering and performing treatments and interventions, re-evaluation of patient's condition and review of old charts    I assumed direction of critical care for this patient from another provider in my specialty: yes      Medical Decision Making          Disposition  Final diagnoses:   Substance abuse (HCC)   Altered mental status   Agitation   Encephalopathy acute     Time reflects when diagnosis was documented in both MDM as applicable and the Disposition within this note       Time User Action Codes Description Comment    12/12/2024  5:06 AM Toby Barba Add [F19.10] Substance abuse (HCC)     12/12/2024  5:06 AM Toby Barba [R41.82] Altered mental status     12/12/2024  6:53 AM Toby Barba [R45.1] Agitation     12/12/2024  3:24 PM Debbie Garvin [G93.40] Encephalopathy acute           ED Disposition       ED Disposition   Admit    Condition   Stable    Date/Time   u Dec 12, 2024  3:25 PM    Comment   Case was discussed with IM and the patient's admission status was agreed  to be Admission Status: inpatient status to the service of Dr. Bashir .               Follow-up Information    None       Patient's Medications   Discharge Prescriptions    No medications on file     No discharge procedures on file.       ED Provider  Electronically Signed by     Debbie Garvin MD  12/12/24 4236       Debbie Garvin MD  12/12/24 3351

## 2024-12-12 NOTE — ED NOTES
Patient's bodily movements remain uncontrolled. Patient continuous to pose safety risk to self  by standing on bed and jumping. Attempted to verbally redirect by multiple staff members with no success. Provider made aware and additional medications ordered and administered.      April Turk RN  12/12/24 5364

## 2024-12-12 NOTE — ED NOTES
Patient removed from four-point locked restraints, he is resting in bed with equal, even, and unlabored respirations. Continuous cardiac and pulse oximetry monitoring on per protocol. 1:1 outside of room for patient safety.      April Turk RN  12/12/24 7460

## 2024-12-12 NOTE — ED NOTES
Patient transported to CT with security      Angel Diaz RN  12/12/24 0649       Angel Diaz RN  12/12/24 0649

## 2024-12-12 NOTE — ED NOTES
Addendum to ketamine dose - I spoke with the [place] pharmacist to make him aware that the provider is unable to change the concentration of the ketamine in his order set - it only allows him to order the 50mg/ml concentration but we use the 100mg/ml concentration which has 5ml in the vial. Total dose was the same of 260mg - 2.6ml given IM - which was verified by Dr. Barba prior to my administration of same. 240mg was wasted as per protocol.     Damaris Yates RN  12/12/24 0676

## 2024-12-12 NOTE — ED NOTES
Pt still on cardiac monitor, and pulse ox. Pt still restless at this. Family at bedside. Family offers no complaints     Eliane Boyd RN  12/12/24 8031

## 2024-12-12 NOTE — LETTER
[address] MEDICAL SURGICAL UNIT  [address] [facility] [address]  [phone]  Dept: [phone]    December 13, 2024     Patient: Ham Kline   YOB: 1955   Date of Visit: 12/12/2024       To Whom it May Concern:      Ham Kline is currently hospitalized under my professional care. He was hospitalized from 12/12/2024 to 12/13/24. Please excuse from work during this time. Patient may return to work at previous activity level on/or after 12/14/2024    Feel free to call with any questions.    Sincerely,            David Bashir DO FACP  [facility]  Diplomate, [employer] Board of Internal Medicine

## 2024-12-12 NOTE — ASSESSMENT & PLAN NOTE
Prior to admission on Advair  No exacerbation   Fluconazole Counseling:  Patient counseled regarding adverse effects of fluconazole including but not limited to headache, diarrhea, nausea, upset stomach, liver function test abnormalities, taste disturbance, and stomach pain.  There is a rare possibility of liver failure that can occur when taking fluconazole.  The patient understands that monitoring of LFTs and kidney function test may be required, especially at baseline. The patient verbalized understanding of the proper use and possible adverse effects of fluconazole.  All of the patient's questions and concerns were addressed.

## 2024-12-12 NOTE — ED NOTES
Patient continues to be safety risk for self and violence to staff. Patient attempted to bite and strike staff multiple times and became verbally aggressive. Patient was put into four-point locked restraints for safety of patient and staff. Patient continued to be verbally abusive towards staff, provider made aware of situation. Additional medications ordered and administered. 1:1 at bedside for patient safety.      April Turk RN  12/12/24 6267

## 2024-12-12 NOTE — ED NOTES
When rounding on patient, nurse made aware of the restless of patient. Nurse asked family if there was anything we can do for the patient. Family requested medication to help the patient. Spoke with  with the concern and plan of care. Ativan ordered and given to patient. Prior to giving family was not sure if they wanted to give the medication. Nurse made family aware that the way the patient was acting was caused from the overall drugs in system. Family / wife agreed with giving the medication to help with the situation. At this time patient is still restless with patent airway.      Angel Diaz RN  12/12/24 0952       Angel Diaz RN  12/12/24 0952

## 2024-12-12 NOTE — CERTIFIED RECOVERY SPECIALIST
"   Certified  Note    Patient name: Ham Kline  Location: ED 03/ED 03  [facility]: [facility]  Attending:  Debbie Garvin MD MRN [id number]  : 1955  Age: 69 y.o.    Sex: male Date 2024         Substance Use History:     Social History     Substance and Sexual Activity   Alcohol Use Not Currently        Social History     Substance and Sexual Activity   Drug Use Yes    Types: Marijuana, Heroin    Comment: daily         Time spent: 10  Consult rcvd: Y  Patient seen in: ED    Substance used: per wife \"dope\"  Frequency/quantity : daily Last use:       CRS provided introductions and explanation of service. CRS and patients wife engaged in conversation of hospitalization. Wife and CRS discussed patient use and what brought him to the hospital today.    Wife shared that Ham\" uses daily but unsure of exactly what he uses. She also shared that \" he will not stop until he is ready to stop\". Patient remained sleeping while we spoke.     CRS will continue to follow and treatment team will message me when other family members come back to patients room     Rhea Davis       "

## 2024-12-12 NOTE — ED NOTES
Spoke with pts daughter regarding pts care - informed daughter that pt would most likely be here for awhile and if she would like to go home and get some rest or something to eat she could leave her number with me and she can call anytime if she would like updates. Pts daughter states she doesn't feel comfortable with leaving her father with us. States there are some staff she doesn't trust with him and some she would plus states she can't see what we are doing to him if she leaves. Pts daughter states how she can get him to another hospital  - informed pts daughter that she would need to sign him out and take him to another hospital. This RN informed pts daughter that we will give him some ativan to help him relax while we wait for the drugs to get out of his system. Pt is extremely restless. This RN spoke with charge nurse/director regarding pts concerns. Concerns brought up to .  currently in speaking with daughter     Eliane ESQUIVEL Deb, RN  12/12/24 0301

## 2024-12-12 NOTE — ED NOTES
Daughter for pt on the phone requesting an update on her father -  updated daughter. Pts wife at bedside. Pt more comfortable     Eliane Boyd RN  12/12/24 1055

## 2024-12-12 NOTE — ASSESSMENT & PLAN NOTE
History of hyperlipidemia asthma and polysubstance abuse who presented to the hospital for overdose/change in mental status  Wife reports patient was usual state of health yesterday.  He has been using drugs  Family agitated and had to be sedated with lorazepam haloperidol benztropine and ketamine in the ED  IV fluids and monitor for any further agitation/withdrawal symptoms

## 2024-12-12 NOTE — RESTRAINT FACE TO FACE
Restraint Face to Face   Ham Kline 69 y.o. male MRN: [id number]  Unit/Bed#: ED 03 Encounter: [id number]      Physical Evaluation agitated, screaming, attempting to strike staff members  Purpose for Restraints/ Seclusion High risk for self harm  Patient's reaction to the intervention agitated  Patient's medical condition stable/guarded  Patient's Behavioral condition agitated  Restraints to be Continued

## 2024-12-12 NOTE — ASSESSMENT & PLAN NOTE
Mildly elevated CK does not meet definition for rhabdomyolysis.  IV fluids and recheck tomorrow    Results from last 7 days   Lab Units 12/12/24  0607   CK TOTAL U/L 429*

## 2024-12-12 NOTE — H&P
H&P - Hospitalist   Name: Ham Kline 69 y.o. male I MRN: [id number]  Unit/Bed#: [facility] I Date of Admission: 12/12/2024   Date of Service: 12/12/2024 I Hospital Day: 0     Assessment & Plan  Toxic encephalopathy  History of hyperlipidemia asthma and polysubstance abuse who presented to the hospital for overdose/change in mental status  Wife reports patient was usual state of health yesterday.  He has been using drugs  Family agitated and had to be sedated with lorazepam haloperidol benztropine and ketamine in the ED  IV fluids and monitor for any further agitation/withdrawal symptoms  Polysubstance abuse (HCC)  Patient has been snorting suboxone and heroin  Hyperlipidemia  Continue atorvastatin  Asthma  Prior to admission on Advair  No exacerbation  Elevated CK  Mildly elevated CK does not meet definition for rhabdomyolysis.  IV fluids and recheck tomorrow    Results from last 7 days   Lab Units 12/12/24  0607   CK TOTAL U/L 429*         VTE Pharmacologic Prophylaxis: VTE Score: 4 Moderate Risk (Score 3-4) - Pharmacological DVT Prophylaxis Ordered: heparin.  Code Status: Level 1 - Full Code   Discussion with family: Updated  (wife) at bedside.    Anticipated Length of Stay: Patient will be admitted on an inpatient basis with an anticipated length of stay of greater than 2 midnights secondary to acute encephalopathy.    History of Present Illness   Chief Complaint: Change in mental status after snorting heroin and suboxone    Ham Kline is a 69 y.o. male with a PMH of asthma hyperlipidemia and polysubstance abuse who presents with twitching.  Spouse at bedside to help provide history.  Charting in the ED also reviewed.  The patient was in his usual state of health.  Last night he woke up with agitation and twitching.  He was brought to the hospital by granddaughter where he became very agitated with difficult to control requiring multiple sedatives.  Wife reports he has been using drugs again  inhalation.  He does smoke cigarettes.  Due to encephalopathy admission has been requested.    Review of Systems   Constitutional:  Negative for chills and fever.   HENT:  Negative for facial swelling.    Eyes:  Negative for discharge.   Respiratory:  Negative for shortness of breath.    Cardiovascular:  Negative for chest pain.   Gastrointestinal:  Negative for diarrhea, nausea and vomiting.   Genitourinary:  Negative for hematuria.   Musculoskeletal:  Negative for back pain.   Skin:  Negative for rash.   Neurological:  Negative for facial asymmetry.   Psychiatric/Behavioral:  Positive for agitation and confusion.    All other systems reviewed and are negative.      Historical Information   Past Medical History:   Diagnosis Date    Asthma     Hyperlipidemia     Polysubstance abuse (HCC)      History reviewed. No pertinent surgical history.  Social History     Tobacco Use    Smoking status: Every Day     Current packs/day: 0.50     Types: Cigarettes    Smokeless tobacco: Never   Substance and Sexual Activity    Alcohol use: Not Currently    Drug use: Yes     Types: Marijuana, Heroin     Comment: daily    Sexual activity: Not on file     E-Cigarette/Vaping     E-Cigarette/Vaping Substances     History reviewed. No pertinent family history.  Social History:  Marital Status: /Civil Union   Occupation:   Patient Pre-hospital Living Situation:   Patient Pre-hospital Level of Mobility:   Patient Pre-hospital Diet Restrictions:     Meds/Allergies   I have reviewed home medications with patient family member.  Prior to Admission Medications   Prescriptions Last Dose Informant Patient Reported? Taking?   Aspirin Low Dose 81 MG EC tablet   Yes Yes   Sig: Take 81 mg by mouth daily   albuterol (ProAir HFA) 90 mcg/act inhaler   No No   Sig: Inhale 2 puffs every 6 (six) hours as needed for wheezing   atorvastatin (LIPITOR) 40 mg tablet   Yes Yes   Sig: Take 40 mg by mouth daily   fluticasone-salmeterol (AIRDUO RESPICLICK)  113-14 mcg/act dry powder inhaler   Yes Yes   Sig: Inhale 1 puff 2 (two) times a day      Facility-Administered Medications: None       No Known Allergies    Objective :  Temp:  [98.2 °F (36.8 °C)] 98.2 °F (36.8 °C)  HR:  [57-93] 68  BP: (112-194)/(64-97) 138/76  Resp:  [16-26] 26  SpO2:  [94 %-100 %] 97 %  O2 Device: None (Room air)    Physical Exam  Vitals reviewed.   Constitutional:       General: He is sleeping.      Interventions: He is sedated.   HENT:      Head: Atraumatic.   Eyes:      Conjunctiva/sclera: Conjunctivae normal.   Cardiovascular:      Rate and Rhythm: Regular rhythm.   Pulmonary:      Effort: Pulmonary effort is normal.      Breath sounds: Decreased breath sounds present. No wheezing.   Abdominal:      General: Bowel sounds are normal.      Palpations: Abdomen is soft.      Tenderness: There is no abdominal tenderness.   Musculoskeletal:         General: No swelling.   Skin:     General: Skin is warm and dry.   Psychiatric:         Mood and Affect: Mood normal.          Lines/Drains:            Lab Results: I have reviewed the following results:  Results from last 7 days   Lab Units 12/12/24  0607   WBC Thousand/uL 8.40   HEMOGLOBIN g/dL 10.5*   HEMATOCRIT % 33.4*   PLATELETS Thousands/uL 269   SEGS PCT % 80*   LYMPHO PCT % 12*   MONO PCT % 5   EOS PCT % 2     Results from last 7 days   Lab Units 12/12/24  0607   SODIUM mmol/L 139   POTASSIUM mmol/L 3.6   CHLORIDE mmol/L 105   CO2 mmol/L 23   BUN mg/dL 11   CREATININE mg/dL 0.70   ANION GAP mmol/L 11   CALCIUM mg/dL 8.3*   ALBUMIN g/dL 3.7   TOTAL BILIRUBIN mg/dL 0.24   ALK PHOS U/L 60   ALT U/L 11   AST U/L 30   GLUCOSE RANDOM mg/dL 107         Results from last 7 days   Lab Units 12/12/24  0520   POC GLUCOSE mg/dl 107         Imaging Results Review: I reviewed radiology reports from this admission including: ..  CT head without contrast  Result Date: 12/12/2024  Impression: No acute intracranial abnormality. Paranasal sinus disease, as  described above. Clinical correlation regarding the possibility of acute sinusitis is suggested. Workstation performed: [id number]     12-lead EKG: Normal sinus rhythm 88 bpm    Administrative Statements       ** Please Note: This note has been constructed using a voice recognition system. **

## 2024-12-12 NOTE — PLAN OF CARE
Problem: PAIN - ADULT  Goal: Verbalizes/displays adequate comfort level or baseline comfort level  Description: Interventions:  - Encourage patient to monitor pain and request assistance  - Assess pain using appropriate pain scale  - Administer analgesics based on type and severity of pain and evaluate response  - Implement non-pharmacological measures as appropriate and evaluate response  - Consider cultural and social influences on pain and pain management  - Notify physician/advanced practitioner if interventions unsuccessful or patient reports new pain  Outcome: Progressing     Problem: INFECTION - ADULT  Goal: Absence or prevention of progression during hospitalization  Description: INTERVENTIONS:  - Assess and monitor for signs and symptoms of infection  - Monitor lab/diagnostic results  - Monitor all insertion sites, i.e. indwelling lines, tubes, and drains  - Monitor endotracheal if appropriate and nasal secretions for changes in amount and color  - [place] appropriate cooling/warming therapies per order  - Administer medications as ordered  - Instruct and encourage patient and family to use good hand hygiene technique  - Identify and instruct in appropriate isolation precautions for identified infection/condition  Outcome: Progressing  Goal: Absence of fever/infection during neutropenic period  Description: INTERVENTIONS:  - Monitor WBC    Outcome: Progressing     Problem: SAFETY ADULT  Goal: Patient will remain free of falls  Description: INTERVENTIONS:  - Educate patient/family on patient safety including physical limitations  - Instruct patient to call for assistance with activity   - Consult OT/PT to assist with strengthening/mobility   - Keep Call bell within reach  - Keep bed low and locked with side rails adjusted as appropriate  - Keep care items and personal belongings within reach  - Initiate and maintain comfort rounds  - Make Fall Risk Sign visible to staff  - Apply yellow socks and bracelet  for high fall risk patients  - Consider moving patient to room near nurses station  Outcome: Progressing  Goal: Maintain or return to baseline ADL function  Description: INTERVENTIONS:  -  Assess patient's ability to carry out ADLs; assess patient's baseline for ADL function and identify physical deficits which impact ability to perform ADLs (bathing, care of mouth/teeth, toileting, grooming, dressing, etc.)  - Assess/evaluate cause of self-care deficits   - Assess range of motion  - Assess patient's mobility; develop plan if impaired  - Assess patient's need for assistive devices and provide as appropriate  - Encourage maximum independence but intervene and supervise when necessary  - Involve family in performance of ADLs  - Assess for home care needs following discharge   - Consider OT consult to assist with ADL evaluation and planning for discharge  - Provide patient education as appropriate  Outcome: Progressing  Goal: Maintains/Returns to pre admission functional level  Description: INTERVENTIONS:  - Perform AM-PAC 6 Click Basic Mobility/ Daily Activity assessment daily.  - Set and communicate daily mobility goal to care team and patient/family/caregiver.   - Collaborate with rehabilitation services on mobility goals if consulted  - Out of bed for toileting  - Record patient progress and toleration of activity level   Outcome: Progressing     Problem: DISCHARGE PLANNING  Goal: Discharge to home or other facility with appropriate resources  Description: INTERVENTIONS:  - Identify barriers to discharge w/patient and caregiver  - Arrange for needed discharge resources and transportation as appropriate  - Identify discharge learning needs (meds, wound care, etc.)  - Arrange for interpretive services to assist at discharge as needed  - Refer to Case Management Department for coordinating discharge planning if the patient needs post-hospital services based on physician/advanced practitioner order or complex needs  related to functional status, cognitive ability, or social support system  Outcome: Progressing     Problem: Knowledge Deficit  Goal: Patient/family/caregiver demonstrates understanding of disease process, treatment plan, medications, and discharge instructions  Description: Complete learning assessment and assess knowledge base.  Interventions:  - Provide teaching at level of understanding  - Provide teaching via preferred learning methods  Outcome: Progressing     Problem: PAIN - ADULT  Goal: Verbalizes/displays adequate comfort level or baseline comfort level  Description: Interventions:  - Encourage patient to monitor pain and request assistance  - Assess pain using appropriate pain scale  - Administer analgesics based on type and severity of pain and evaluate response  - Implement non-pharmacological measures as appropriate and evaluate response  - Consider cultural and social influences on pain and pain management  - Notify physician/advanced practitioner if interventions unsuccessful or patient reports new pain  Outcome: Progressing     Problem: INFECTION - ADULT  Goal: Absence or prevention of progression during hospitalization  Description: INTERVENTIONS:  - Assess and monitor for signs and symptoms of infection  - Monitor lab/diagnostic results  - Monitor all insertion sites, i.e. indwelling lines, tubes, and drains  - Monitor endotracheal if appropriate and nasal secretions for changes in amount and color  - [place] appropriate cooling/warming therapies per order  - Administer medications as ordered  - Instruct and encourage patient and family to use good hand hygiene technique  - Identify and instruct in appropriate isolation precautions for identified infection/condition  Outcome: Progressing  Goal: Absence of fever/infection during neutropenic period  Description: INTERVENTIONS:  - Monitor WBC    Outcome: Progressing     Problem: SAFETY ADULT  Goal: Patient will remain free of falls  Description:  INTERVENTIONS:  - Educate patient/family on patient safety including physical limitations  - Instruct patient to call for assistance with activity   - Consult OT/PT to assist with strengthening/mobility   - Keep Call bell within reach  - Keep bed low and locked with side rails adjusted as appropriate  - Keep care items and personal belongings within reach  - Initiate and maintain comfort rounds  - Make Fall Risk Sign visible to staff  - Apply yellow socks and bracelet for high fall risk patients  - Consider moving patient to room near nurses station  Outcome: Progressing  Goal: Maintain or return to baseline ADL function  Description: INTERVENTIONS:  -  Assess patient's ability to carry out ADLs; assess patient's baseline for ADL function and identify physical deficits which impact ability to perform ADLs (bathing, care of mouth/teeth, toileting, grooming, dressing, etc.)  - Assess/evaluate cause of self-care deficits   - Assess range of motion  - Assess patient's mobility; develop plan if impaired  - Assess patient's need for assistive devices and provide as appropriate  - Encourage maximum independence but intervene and supervise when necessary  - Involve family in performance of ADLs  - Assess for home care needs following discharge   - Consider OT consult to assist with ADL evaluation and planning for discharge  - Provide patient education as appropriate  Outcome: Progressing  Goal: Maintains/Returns to pre admission functional level  Description: INTERVENTIONS:  - Perform AM-PAC 6 Click Basic Mobility/ Daily Activity assessment daily.  - Set and communicate daily mobility goal to care team and patient/family/caregiver.   - Collaborate with rehabilitation services on mobility goals if consulted  - Out of bed for toileting  - Record patient progress and toleration of activity level   Outcome: Progressing     Problem: DISCHARGE PLANNING  Goal: Discharge to home or other facility with appropriate  resources  Description: INTERVENTIONS:  - Identify barriers to discharge w/patient and caregiver  - Arrange for needed discharge resources and transportation as appropriate  - Identify discharge learning needs (meds, wound care, etc.)  - Arrange for interpretive services to assist at discharge as needed  - Refer to Case Management Department for coordinating discharge planning if the patient needs post-hospital services based on physician/advanced practitioner order or complex needs related to functional status, cognitive ability, or social support system  Outcome: Progressing     Problem: Knowledge Deficit  Goal: Patient/family/caregiver demonstrates understanding of disease process, treatment plan, medications, and discharge instructions  Description: Complete learning assessment and assess knowledge base.  Interventions:  - Provide teaching at level of understanding  - Provide teaching via preferred learning methods  Outcome: Progressing

## 2024-12-12 NOTE — ED NOTES
Pt granddaughter wanting to come back. As per  - pt isnt allowed back     Eliane Boyd RN  12/12/24 [id number]

## 2024-12-12 NOTE — ED NOTES
Patient has small burst of restless with wife at bedside redirecting him. 1 to 1 still assigned at this time.      Angel Diaz RN  12/12/24 0312

## 2024-12-12 NOTE — ED NOTES
Pt stood up and peed his pants. While attempting to take his clothes off pt urinated on the floor. Linens and clothes changed. Pt placed back on monitor and back into bed. Pt with tachypneic and wheezing noted - provider made aware     Eliane Boyd RN  12/12/24 0011

## 2024-12-12 NOTE — ED PROVIDER NOTES
"Time reflects when diagnosis was documented in both MDM as applicable and the Disposition within this note       Time User Action Codes Description Comment    12/12/2024  5:06 AM Toby Barba Add [F19.10] Substance abuse (HCC)     12/12/2024  5:06 AM Toby Barba Add [R41.82] Altered mental status           ED Disposition       None          Assessment & Plan       Medical Decision Making  69-year-old presents after snorting heroin and taking Suboxone.  He reports that he has been feeling agitated and \"twitchy\".  Denies any nausea, vomiting, diarrhea.  The patient is agitated and having a difficult time remaining still.  Discussed administration of Ativan with the patient and his granddaughter.  Patient wished to remain.  Made aware of the need to.    Risk  Prescription drug management.        ED Course as of 12/12/24 0653   Thu Dec 12, 2024   0501 Patient remains agitated.  Granddaughter is requesting transfer to another facility.  Informed her that were not able to transfer him as there is no indication to do so but if she wishes, she is welcome to take him to another facility.  Patient continues to deny use of any other illicit substances although his actions and behaviors are consistent with methamphetamine or similar type of drug use.  Seizure pads have been placed on the bed.  Multiple staff members have been in the room with the patient as he continues to flail about and stand on the bed.  Patient's granddaughter states that she does not wish to be at this hospital because \"this hospital killed my little girl\".   0503 .   0529 Patient continues to have escalating agitation and violent behavior despite receiving several medications.  At this 6-7 staff members are required to hold the patient down to keep him safe.  Will place him in locked restraints and give additional medications for his safety.  Fingerstick is 107.   0545 The patient continued to thrash and exhibited violent behaviors.  IM ketamine was " given order to allow for IV placement and for patient's safety.  Will continue to monitor closely.   0600 Patient is now beginning to relax.  Oxygen set up at the bedside in case of hypoxia.  Will place an IV and draw labs at this time.  Plan to check a head CT as well but will wait to ensure that the patient remains calm.   0648 The patient remains calm at this point.  Will transport up to CT.  He has remained out of restraints.  His daughter is at the bedside and has been updated.       Medications   sodium chloride 0.9 % bolus 1,000 mL (1,000 mL Intravenous New Bag 12/12/24 0606)   LORazepam (ATIVAN) injection 2 mg (2 mg Intramuscular Given 12/12/24 0441)   haloperidol lactate (HALDOL) injection 5 mg (5 mg Intramuscular Given 12/12/24 0519)   benztropine (COGENTIN) injection 1 mg (1 mg Intramuscular Given 12/12/24 0519)   ketamine (Ketalar) injection 260 mg (260 mg Intramuscular Given 12/12/24 0545)   ketamine (KETALAR) injection 260 mg (260 mg Intramuscular Given 12/12/24 0542)       ED Risk Strat Scores                          SBIRT 22yo+      Flowsheet Row Most Recent Value   Initial Alcohol Screen: US AUDIT-C     1. How often do you have a drink containing alcohol? 0 Filed at: 12/12/2024 0436   2. How many drinks containing alcohol do you have on a typical day you are drinking?  0 Filed at: 12/12/2024 0436   3b. FEMALE Any Age, or MALE 65+: How often do you have 4 or more drinks on one occassion? 0 Filed at: 12/12/2024 0436   Audit-C Score 0 Filed at: 12/12/2024 0436   GERMÁN: How many times in the past year have you...    Used an illegal drug or used a prescription medication for non-medical reasons? Weekly Filed at: 12/12/2024 0436   DAST-10: In the past 12 months...    1. Have you used drugs other than those required for medical reasons? 0 Filed at: 12/12/2024 0436   2. Do you use more than one drug at a time? 0 Filed at: 12/12/2024 0436   3. Have you had medical problems as a result of your drug use (e.g.,  "memory loss, hepatitis, convulsions, bleeding, etc.)? 1 Filed at: 12/12/2024 0436   4. Have you had \"blackouts\" or \"flashbacks\" as a result of drug use?YesNo 0 Filed at: 12/12/2024 0436   5. Do you ever feel bad or guilty about your drug use? 0 Filed at: 12/12/2024 0436   6. Does your spouse (or parent) ever complain about your involvement with drugs? 1 Filed at: 12/12/2024 0436   7. Have you neglected your family because of your use of drugs? 1 Filed at: 12/12/2024 0436   8. Have you engaged in illegal activities in order to obtain drugs? 1 Filed at: 12/12/2024 0436   9. Have you ever experienced withdrawal symptoms (felt sick) when you stopped taking drugs? 1 Filed at: 12/12/2024 0436   10. Are you always able to stop using drugs when you want to? 1 Filed at: 12/12/2024 0436   DAST-10 Score 6 Filed at: 12/12/2024 0436                            History of Present Illness       Chief Complaint   Patient presents with    Overdose - Accidental     Pt admits to snorting heroin last night and taking suboxone about an hour ago. Pt twitching.        Past Medical History:   Diagnosis Date    Asthma       History reviewed. No pertinent surgical history.   History reviewed. No pertinent family history.   Social History     Tobacco Use    Smoking status: Every Day     Current packs/day: 0.50     Types: Cigarettes    Smokeless tobacco: Never   Substance Use Topics    Alcohol use: Not Currently    Drug use: Yes     Types: Marijuana, Heroin     Comment: daily      E-Cigarette/Vaping      E-Cigarette/Vaping Substances      I have reviewed and agree with the history as documented.       Overdose - Accidental  Ingested substance:  Illicit drugs  Suspected agents: heroin, suboxone.  Incident location:  Home  Context: recreational    Associated symptoms: agitation and altered mental status        Review of Systems   Psychiatric/Behavioral:  Positive for agitation.    All other systems reviewed and are negative.          Objective "       ED Triage Vitals [12/12/24 0434]   Temperature Pulse Blood Pressure Respirations SpO2 Patient Position - Orthostatic VS   98.2 °F (36.8 °C) 78 (!) 177/84 20 100 % Lying      Temp Source Heart Rate Source BP Location FiO2 (%) Pain Score    Tympanic Monitor Left arm -- --      Vitals      Date and Time Temp Pulse SpO2 Resp BP Pain Score FACES Pain Rating User   12/12/24 0621 -- 86 98 % 18 161/78 -- -- ASHLEY   12/12/24 0556 -- 87 96 % 18 184/83 -- -- ASHLEY   12/12/24 0434 98.2 °F (36.8 °C) 78 100 % 20 177/84 -- -- ASHLEY            Physical Exam  Vitals and nursing note reviewed.   Constitutional:       Appearance: Normal appearance. He is well-developed. He is not toxic-appearing or diaphoretic.   HENT:      Head: Normocephalic and atraumatic.      Right Ear: External ear normal.      Left Ear: External ear normal.      Nose: Nose normal.      Mouth/Throat:      Mouth: Mucous membranes are moist.      Pharynx: Oropharynx is clear.   Eyes:      Conjunctiva/sclera: Conjunctivae normal.      Pupils: Pupils are equal, round, and reactive to light.   Cardiovascular:      Rate and Rhythm: Normal rate and regular rhythm.      Heart sounds: Normal heart sounds.   Pulmonary:      Effort: Pulmonary effort is normal. No respiratory distress.      Breath sounds: Normal breath sounds.   Abdominal:      General: Bowel sounds are normal. There is no distension.      Palpations: Abdomen is soft.      Tenderness: There is no abdominal tenderness. There is no guarding.   Musculoskeletal:         General: Normal range of motion.      Cervical back: Neck supple. No rigidity.      Right lower leg: No edema.      Left lower leg: No edema.   Skin:     General: Skin is warm and dry.      Capillary Refill: Capillary refill takes less than 2 seconds.   Neurological:      General: No focal deficit present.      Mental Status: He is alert and oriented to person, place, and time.      Motor: Tremor present.   Psychiatric:         Behavior: Behavior  is agitated.         Results Reviewed       Procedure Component Value Units Date/Time    Comprehensive metabolic panel [[id number]]  (Abnormal) Collected: 12/12/24 0607    Lab Status: Final result Specimen: Blood from Arm, Left Updated: 12/12/24 0645     Sodium 139 mmol/L      Potassium 3.6 mmol/L      Chloride 105 mmol/L      CO2 23 mmol/L      ANION GAP 11 mmol/L      BUN 11 mg/dL      Creatinine 0.70 mg/dL      Glucose 107 mg/dL      Calcium 8.3 mg/dL      AST 30 U/L      ALT 11 U/L      Alkaline Phosphatase 60 U/L      Total Protein 7.2 g/dL      Albumin 3.7 g/dL      Total Bilirubin 0.24 mg/dL      eGFR 96 ml/min/1.73sq m     Narrative:      National Kid[employer] Disease Foundation guidelines for Chronic Kidney Disease (CKD):     Stage 1 with normal or high GFR (GFR > 90 mL/min/1.73 square meters)    Stage 2 Mild CKD (GFR = 60-89 mL/min/1.73 square meters)    Stage 3A Moderate CKD (GFR = 45-59 mL/min/1.73 square meters)    Stage 3B Moderate CKD (GFR = 30-44 mL/min/1.73 square meters)    Stage 4 Severe CKD (GFR = 15-29 mL/min/1.73 square meters)    Stage 5 End Stage CKD (GFR <15 mL/min/1.73 square meters)  Note: GFR calculation is accurate only with a steady state creatinine    Ethanol [[id number]]  (Normal) Collected: 12/12/24 0607    Lab Status: Final result Specimen: Blood from Arm, Left Updated: 12/12/24 0645     Ethanol Lvl <10 mg/dL     CK [[id number]]  (Abnormal) Collected: 12/12/24 0607    Lab Status: Final result Specimen: Blood from Arm, Left Updated: 12/12/24 0643     Total  U/L     CBC and differential [[id number]]  (Abnormal) Collected: 12/12/24 0607    Lab Status: Final result Specimen: Blood from Arm, Left Updated: 12/12/24 0625     WBC 8.40 Thousand/uL      RBC 3.69 Million/uL      Hemoglobin 10.5 g/dL      Hematocrit 33.4 %      MCV 91 fL      MCH 28.5 pg      MCHC 31.4 g/dL      RDW 13.7 %      MPV 8.8 fL      Platelets 269 Thousands/uL      nRBC 0 /100 WBCs      Segmented % 80 %      Immature  Grans % 0 %      Lymphocytes % 12 %      Monocytes % 5 %      Eosinophils Relative 2 %      Basophils Relative 1 %      Absolute Neutrophils 6.76 Thousands/µL      Absolute Immature Grans 0.03 Thousand/uL      Absolute Lymphocytes 1.01 Thousands/µL      Absolute Monocytes 0.41 Thousand/µL      Eosinophils Absolute 0.14 Thousand/µL      Basophils Absolute 0.05 Thousands/µL     Rapid drug screen, urine [[id number]]  (Abnormal) Collected: 12/12/24 0519    Lab Status: Final result Specimen: Urine, Clean Catch Updated: 12/12/24 0539     Amph/Meth UR Negative     Barbiturate Ur Negative     Benzodiazepine Urine Negative     Cocaine Urine Positive     Methadone Urine Negative     Opiate Urine Positive     PCP Ur Negative     THC Urine Positive     Oxycodone Urine Negative     Fentanyl Urine Positive     HYDROCODONE URINE Negative    Narrative:      Presumptive report. If requested, specimen will be sent to reference lab for confirmation.  FOR MEDICAL PURPOSES ONLY.   IF CONFIRMATION NEEDED PLEASE CONTACT THE LAB WITHIN 5 DAYS.    Drug Screen Cutoff Levels:  AMPHETAMINE/METHAMPHETAMINES  1000 ng/mL  BARBITURATES     200 ng/mL  BENZODIAZEPINES     200 ng/mL  COCAINE      300 ng/mL  METHADONE      300 ng/mL  OPIATES      300 ng/mL  PHENCYCLIDINE     25 ng/mL  THC       50 ng/mL  OXYCODONE      100 ng/mL  FENTANYL      5 ng/mL  HYDROCODONE     300 ng/mL    Fingerstick Glucose (POCT) [[id number]]  (Normal) Collected: 12/12/24 0520    Lab Status: Final result Specimen: Blood Updated: 12/12/24 0521     POC Glucose 107 mg/dl             CT head without contrast    (Results Pending)       ECG 12 Lead Documentation Only    Date/Time: 12/12/2024 6:47 AM    Performed by: Toby Barba DO  Authorized by: Toby Barba DO    ECG reviewed by me, the ED Provider: yes    Patient location:  ED  Rate:     ECG rate:  88    ECG rate assessment: normal    Rhythm:     Rhythm: sinus rhythm    Ectopy:     Ectopy: none    QRS:     QRS intervals:   Normal  Conduction:     Conduction: normal    ST segments:     ST segments:  Normal  T waves:     T waves: non-specific        ED Medication and Procedure Management   Prior to Admission Medications   Prescriptions Last Dose Informant Patient Reported? Taking?   Aspirin Low Dose 81 MG EC tablet   Yes Yes   Sig: Take 81 mg by mouth daily   acetaminophen (TYLENOL) 650 mg CR tablet   No No   Sig: Take 1 tablet (650 mg total) by mouth every 8 (eight) hours as needed for mild pain   albuterol (ProAir HFA) 90 mcg/act inhaler   No No   Sig: Inhale 2 puffs every 6 (six) hours as needed for wheezing   atorvastatin (LIPITOR) 40 mg tablet   Yes Yes   Sig: Take 40 mg by mouth   fluticasone-salmeterol (AIRDUO RESPICLICK) 113-14 mcg/act dry powder inhaler   Yes Yes   Sig: Inhale 1 puff 2 (two) times a day   meclizine (ANTIVERT) 25 mg tablet   No No   Sig: Take 1 tablet (25 mg total) by mouth 3 (three) times a day as needed for dizziness   naproxen (EC NAPROSYN) 500 MG EC tablet   No No   Sig: Take 1 tablet (500 mg total) by mouth 2 (two) times a day with meals      Facility-Administered Medications: None     Patient's Medications   Discharge Prescriptions    No medications on file     No discharge procedures on file.  ED SEPSIS DOCUMENTATION   Time reflects when diagnosis was documented in both MDM as applicable and the Disposition within this note       Time User Action Codes Description Comment    12/12/2024  5:06 AM Toby Barba Add [F19.10] Substance abuse (HCC)     12/12/2024  5:06 AM Toby Barba Add [R41.82] Altered mental status                  Toby Barba DO  12/12/24 0653

## 2024-12-13 VITALS
TEMPERATURE: 98.9 F | BODY MASS INDEX: 18.25 KG/M2 | HEIGHT: 64 IN | HEART RATE: 66 BPM | RESPIRATION RATE: 18 BRPM | OXYGEN SATURATION: 95 % | SYSTOLIC BLOOD PRESSURE: 150 MMHG | WEIGHT: 106.92 LBS | DIASTOLIC BLOOD PRESSURE: 74 MMHG

## 2024-12-13 PROBLEM — R63.6 UNDERWEIGHT (BMI < 18.5): Status: ACTIVE | Noted: 2024-12-13

## 2024-12-13 LAB
ALBUMIN SERPL BCG-MCNC: 3.9 G/DL (ref 3.5–5)
ALP SERPL-CCNC: 62 U/L (ref 34–104)
ALT SERPL W P-5'-P-CCNC: 13 U/L (ref 7–52)
ANION GAP SERPL CALCULATED.3IONS-SCNC: 8 MMOL/L (ref 4–13)
AST SERPL W P-5'-P-CCNC: 42 U/L (ref 13–39)
BILIRUB SERPL-MCNC: 0.6 MG/DL (ref 0.2–1)
BUN SERPL-MCNC: 9 MG/DL (ref 5–25)
CALCIUM SERPL-MCNC: 9.2 MG/DL (ref 8.4–10.2)
CHLORIDE SERPL-SCNC: 106 MMOL/L (ref 96–108)
CK SERPL-CCNC: 922 U/L (ref 39–308)
CO2 SERPL-SCNC: 26 MMOL/L (ref 21–32)
CREAT SERPL-MCNC: 0.73 MG/DL (ref 0.6–1.3)
ERYTHROCYTE [DISTWIDTH] IN BLOOD BY AUTOMATED COUNT: 13.8 % (ref 11.6–15.1)
GFR SERPL CREATININE-BSD FRML MDRD: 95 ML/MIN/1.73SQ M
GLUCOSE SERPL-MCNC: 125 MG/DL (ref 65–140)
HCT VFR BLD AUTO: 39.5 % (ref 36.5–49.3)
HGB BLD-MCNC: 12.2 G/DL (ref 12–17)
MCH RBC QN AUTO: 27.7 PG (ref 26.8–34.3)
MCHC RBC AUTO-ENTMCNC: 30.9 G/DL (ref 31.4–37.4)
MCV RBC AUTO: 90 FL (ref 82–98)
PLATELET # BLD AUTO: 302 THOUSANDS/UL (ref 149–390)
PMV BLD AUTO: 9.3 FL (ref 8.9–12.7)
POTASSIUM SERPL-SCNC: 3.9 MMOL/L (ref 3.5–5.3)
PROT SERPL-MCNC: 7.6 G/DL (ref 6.4–8.4)
RBC # BLD AUTO: 4.4 MILLION/UL (ref 3.88–5.62)
SODIUM SERPL-SCNC: 140 MMOL/L (ref 135–147)
WBC # BLD AUTO: 9.08 THOUSAND/UL (ref 4.31–10.16)

## 2024-12-13 PROCEDURE — 82550 ASSAY OF CK (CPK): CPT | Performed by: INTERNAL MEDICINE

## 2024-12-13 PROCEDURE — 80053 COMPREHEN METABOLIC PANEL: CPT | Performed by: INTERNAL MEDICINE

## 2024-12-13 PROCEDURE — 99239 HOSP IP/OBS DSCHRG MGMT >30: CPT | Performed by: INTERNAL MEDICINE

## 2024-12-13 PROCEDURE — 85027 COMPLETE CBC AUTOMATED: CPT | Performed by: INTERNAL MEDICINE

## 2024-12-13 RX ADMIN — DEXTROSE AND SODIUM CHLORIDE 100 ML/HR: 5; .9 INJECTION, SOLUTION INTRAVENOUS at 03:11

## 2024-12-13 RX ADMIN — ATORVASTATIN CALCIUM 40 MG: 20 TABLET, FILM COATED ORAL at 09:05

## 2024-12-13 RX ADMIN — ASPIRIN 81 MG: 81 TABLET, COATED ORAL at 09:04

## 2024-12-13 RX ADMIN — FLUTICASONE FUROATE AND VILANTEROL TRIFENATATE 1 PUFF: 100; 25 POWDER RESPIRATORY (INHALATION) at 09:05

## 2024-12-13 RX ADMIN — ENOXAPARIN SODIUM 40 MG: 40 INJECTION SUBCUTANEOUS at 09:05

## 2024-12-13 RX ADMIN — ALBUTEROL SULFATE 2 PUFF: 90 AEROSOL, METERED RESPIRATORY (INHALATION) at 05:02

## 2024-12-13 NOTE — ASSESSMENT & PLAN NOTE
Mildly elevated CK does not meet definition for rhabdomyolysis.    Was on IV fluids overnight  Advised oral hydration    Results from last 7 days   Lab Units 12/13/24  0507 12/12/24  0607   CK TOTAL U/L 922* 429*

## 2024-12-13 NOTE — ASSESSMENT & PLAN NOTE
History of hyperlipidemia asthma and polysubstance abuse who presented to the hospital for overdose/change in mental status  Wife reports patient was usual state of health day prior to admission.  He has been using drugs  Family agitated and had to be sedated with lorazepam haloperidol benztropine and ketamine in the ED  With IV fluids patient's encephalopathy resolved  Patient now at baseline mentation.  Advised to abstain from illicit substances

## 2024-12-13 NOTE — CERTIFIED RECOVERY SPECIALIST
Certified  Note    Patient name: Ham Kline  Location: 7T [facility] 718/[facility] 718-01  [place]: [facility]  Attending:  David Bashir DO MRN [id number]  : 1955  Age: 69 y.o.    Sex: male Date 2024         Substance Use History:     Social History     Substance and Sexual Activity   Alcohol Use Not Currently        Social History     Substance and Sexual Activity   Drug Use Yes    Types: Marijuana, Heroin    Comment: daily        Time spent with Patient 15 min     CRS follow up with patient.     CRS and patient had conversation that supports and encourages recovery. Discussed plans for aftercare and continued sobriety with daughter present. (Consent was given) Patient reports that he used fentanyl the other day, then didn't feel so good and took a suboxone.    Patient reports that he used to be on suboxone and would like to be on again. Provider arranged for an appointment with [facility] on Monday.     Resources provided and contact information given as well as appointment info         Rhea Davis

## 2024-12-13 NOTE — NURSING NOTE
Patient discharged to home, IV removed.  Discharge instructions given to patient with stated understanding.   Patient left unit with belongings in stable condition.

## 2024-12-13 NOTE — ASSESSMENT & PLAN NOTE
Patient has been snorting suboxone and heroin  Seen by CRS.  Outpatient follow-up with [facility] set up for Monday

## 2024-12-13 NOTE — PLAN OF CARE
Problem: PAIN - ADULT  Goal: Verbalizes/displays adequate comfort level or baseline comfort level  Description: Interventions:  - Encourage patient to monitor pain and request assistance  - Assess pain using appropriate pain scale  - Administer analgesics based on type and severity of pain and evaluate response  - Implement non-pharmacological measures as appropriate and evaluate response  - Consider cultural and social influences on pain and pain management  - Notify physician/advanced practitioner if interventions unsuccessful or patient reports new pain  Outcome: Progressing     Problem: INFECTION - ADULT  Goal: Absence or prevention of progression during hospitalization  Description: INTERVENTIONS:  - Assess and monitor for signs and symptoms of infection  - Monitor lab/diagnostic results  - Monitor all insertion sites, i.e. indwelling lines, tubes, and drains  - Monitor endotracheal if appropriate and nasal secretions for changes in amount and color  - [place] appropriate cooling/warming therapies per order  - Administer medications as ordered  - Instruct and encourage patient and family to use good hand hygiene technique  - Identify and instruct in appropriate isolation precautions for identified infection/condition  Outcome: Progressing  Goal: Absence of fever/infection during neutropenic period  Description: INTERVENTIONS:  - Monitor WBC    Outcome: Progressing     Problem: SAFETY ADULT  Goal: Patient will remain free of falls  Description: INTERVENTIONS:  - Educate patient/family on patient safety including physical limitations  - Instruct patient to call for assistance with activity   - Consult OT/PT to assist with strengthening/mobility   - Keep bed low and locked with side rails adjusted as appropriate  - Initiate and maintain comfort rounds  - Make Fall Risk Sign visible to staff  - Offer Toileting every 2 Hours, in advance of need  - Initiate/Maintain bedalarm  - Obtain necessary fall risk  management equipment: bed alarm,   - Apply yellow socks and bracelet for high fall risk patients  - Consider moving patient to room near nurses station  - continue 1:1  Outcome: Progressing  Goal: Maintain or return to baseline ADL function  Description: INTERVENTIONS:  -  Assess patient's ability to carry out ADLs; assess patient's baseline for ADL function and identify physical deficits which impact ability to perform ADLs (bathing, care of mouth/teeth, toileting, grooming, dressing, etc.)  - Assess/evaluate cause of self-care deficits   - Assess range of motion  - Assess patient's mobility; develop plan if impaired  - Assess patient's need for assistive devices and provide as appropriate  - Encourage maximum independence but intervene and supervise when necessary  - Involve family in performance of ADLs  - Assess for home care needs following discharge   - Consider OT consult to assist with ADL evaluation and planning for discharge  - Provide patient education as appropriate  Outcome: Progressing    Problem: DISCHARGE PLANNING  Goal: Discharge to home or other facility with appropriate resources  Description: INTERVENTIONS:  - Identify barriers to discharge w/patient and caregiver  - Arrange for needed discharge resources and transportation as appropriate  - Identify discharge learning needs (meds, wound care, etc.)  - Arrange for interpretive services to assist at discharge as needed  - Refer to Case Management Department for coordinating discharge planning if the patient needs post-hospital services based on physician/advanced practitioner order or complex needs related to functional status, cognitive ability, or social support system  Outcome: Progressing     Problem: Knowledge Deficit  Goal: Patient/family/caregiver demonstrates understanding of disease process, treatment plan, medications, and discharge instructions  Description: Complete learning assessment and assess knowledge base.  Interventions:  -  Provide teaching at level of understanding  - Provide teaching via preferred learning methods  Outcome: Progressing     Problem: Prexisting or High Potential for Compromised Skin Integrity  Goal: Skin integrity is maintained or improved  Description: INTERVENTIONS:  - Identify patients at risk for skin breakdown  - Assess and monitor skin integrity  - Assess and monitor nutrition and hydration status  - Monitor labs   - Assess for incontinence   - Turn and reposition patient  - Assist with mobility/ambulation  - Relieve pressure over bony prominences  - Avoid friction and shearing  - Provide appropriate hygiene as needed including keeping skin clean and dry  - Evaluate need for skin moisturizer/barrier cream  - Collaborate with interdisciplinary team   - Patient/family teaching  - Consider wound care consult   Outcome: Progressing

## 2024-12-13 NOTE — DISCHARGE SUMMARY
Discharge Summary - Hospitalist   Name: Ham Kline 69 y.o. male I MRN: [id number]  Unit/Bed#: 7T [facility] 718-01 I Date of Admission: 12/12/2024   Date of Service: 12/13/2024 I Hospital Day: 1    Assessment & Plan  Toxic encephalopathy  History of hyperlipidemia asthma and polysubstance abuse who presented to the hospital for overdose/change in mental status  Wife reports patient was usual state of health day prior to admission.  He has been using drugs  Family agitated and had to be sedated with lorazepam haloperidol benztropine and ketamine in the ED  With IV fluids patient's encephalopathy resolved  Patient now at baseline mentation.  Advised to abstain from illicit substances  Polysubstance abuse (HCC)  Patient has been snorting suboxone and heroin  Seen by CRS.  Outpatient follow-up with [facility] set up for Monday  Hyperlipidemia  Continue atorvastatin  Asthma  Prior to admission on Advair  No exacerbation.  Continue as taken  Elevated CK  Mildly elevated CK does not meet definition for rhabdomyolysis.    Was on IV fluids overnight  Advised oral hydration    Results from last 7 days   Lab Units 12/13/24  0507 12/12/24  0607   CK TOTAL U/L 922* 429*     Underweight (BMI < 18.5)  Body mass index is 18.35 kg/m².      Medical Problems       Resolved Problems  Date Reviewed: 12/12/2024   None        Discharging Physician / Practitioner: David Bashir DO  PCP: No primary care provider on file.  Admission Date:   Admission Orders (From admission, onward)       Ordered        12/12/24 1526  INPATIENT ADMISSION  Once                        Discharge Date: 12/13/24    Consultations During Hospital Stay:  CONSULT TO CERTIFIED   CONSULT TO CERTIFIED      Procedures Performed:   * No surgery found *     Images:   CT head without contrast  Result Date: 12/12/2024  Impression: No acute intracranial abnormality. Paranasal sinus disease, as described above. Clinical correlation  regarding the possibility of acute sinusitis is suggested. Workstation performed: [id number]       Lab Results: I have reviewed the following results:  Results from last 7 days   Lab Units 12/13/24  0507 12/12/24  0607   WBC Thousand/uL 9.08 8.40   HEMOGLOBIN g/dL 12.2 10.5*   HEMATOCRIT % 39.5 33.4*   MCV fL 90 91   PLATELETS Thousands/uL 302 269     Results from last 7 days   Lab Units 12/13/24  0507 12/12/24  0607   SODIUM mmol/L 140 139   POTASSIUM mmol/L 3.9 3.6   CHLORIDE mmol/L 106 105   CO2 mmol/L 26 23   BUN mg/dL 9 11   CREATININE mg/dL 0.73 0.70   CALCIUM mg/dL 9.2 8.3*   ALBUMIN g/dL 3.9 3.7   TOTAL BILIRUBIN mg/dL 0.60 0.24   ALK PHOS U/L 62 60   ALT U/L 13 11   AST U/L 42* 30   EGFR ml/min/1.73sq m 95 96   GLUCOSE RANDOM mg/dL 125 107     Results from last 7 days   Lab Units 12/13/24  0507 12/12/24  0607   CK TOTAL U/L 922* 429*                  Results from last 7 days   Lab Units 12/12/24  0520   POC GLUCOSE mg/dl 107             Incidental Findings:      Test Results Pending at Discharge (will require follow up):      Reason for Admission:   Overdose - Accidental (Pt admits to snorting heroin last night and taking suboxone about an hour ago. Pt twitching. )    Hospital Course:   Ham Kline is a 69 y.o. male patient who originally presented to the hospital on 12/12/2024 due to overdose.  The patient has been using illicit substances and took a Suboxone off the street.  He then had significant symptoms including agitation and tremors and was brought to the hospital.  In the ED he required sedation due to his profound agitation.  Overnight all his symptoms resolved.  He met with CRS and is advised to abstain from illicit substances and will be set up with outpatient services.  Plan of care discussed with daughter at bedside.    The patient, initially admitted to the hospital as inpatient, was discharged earlier than expected given the following: Rapid improvement in encephalopathy with resolution of  "all presenting symptoms..    Please see above list of diagnoses and related plan for additional information.     Condition at Discharge: stable     Discharge Day Visit / Exam:   Subjective: Patient seen and examined, mentation has returned to baseline.    Vitals: Blood Pressure: 150/74 (12/13/24 0736)  Pulse: 66 (12/13/24 0736)  Temperature: 98.9 °F (37.2 °C) (12/13/24 0736)  Temp Source: Temporal (12/13/24 0736)  Respirations: 18 (12/13/24 0736)  Height: 5' 4\" (162.6 cm) (12/12/24 1643)  Weight - Scale: 48.5 kg (106 lb 14.8 oz) (12/12/24 1643)  SpO2: 95 % (12/13/24 0736)    Exam:   Physical Exam  Vitals reviewed.   HENT:      Head: Atraumatic.   Cardiovascular:      Rate and Rhythm: Regular rhythm.      Heart sounds: Normal heart sounds.   Pulmonary:      Effort: Pulmonary effort is normal.      Breath sounds: Decreased breath sounds present. No wheezing.   Abdominal:      General: Bowel sounds are normal.      Palpations: Abdomen is soft.      Tenderness: There is no abdominal tenderness.   Musculoskeletal:         General: No swelling or tenderness.   Skin:     General: Skin is warm and dry.   Neurological:      General: No focal deficit present.      Mental Status: He is alert and oriented to person, place, and time.      Motor: No weakness.   Psychiatric:         Mood and Affect: Mood normal.       Discussion with Family: Daughter at bedside    Discharge instructions/Information to patient and family:   See after visit summary for information provided to patient and family.      Provisions for Follow-Up Care:  See after visit summary for information related to follow-up care and any pertinent home health orders.      Mobility at time of Discharge:  Basic Mobility Inpatient Raw Score: 16  JH-HLM Goal: 5: Stand one or more mins  JH-HLM Achieved: 5: Stand (1 or more minutes)  JH-HLM Goal achieved. Continue to encourage appropriate mobility.    Disposition:   Home    Planned Readmission: No     Discharge " Medications:  See after visit summary for reconciled discharge medications provided to patient and family.      Administrative Statements   I spent 35 minutes discharging the patient. This time was spent on the day of discharge. I had direct contact with the patient on the day of discharge. Greater than 50% of the total time was spent examining patient, answering all patient questions, arranging and discussing plan of care with patient as well as directly providing post-discharge instructions.  Additional time then spent on discharge activities.    **Please Note: This note may have been constructed using a voice recognition system**

## 2024-12-16 ENCOUNTER — OFFICE VISIT (OUTPATIENT)
Dept: OTHER | Age: 69
End: 2024-12-16
Payer: MEDICARE

## 2024-12-16 ENCOUNTER — TELEPHONE (OUTPATIENT)
Dept: PSYCHIATRY | Facility: CLINIC | Age: 69
End: 2024-12-16

## 2024-12-16 VITALS
BODY MASS INDEX: 19.4 KG/M2 | WEIGHT: 113 LBS | SYSTOLIC BLOOD PRESSURE: 126 MMHG | HEART RATE: 84 BPM | DIASTOLIC BLOOD PRESSURE: 72 MMHG

## 2024-12-16 DIAGNOSIS — F11.20 OPIOID USE DISORDER, SEVERE, DEPENDENCE (HCC): Primary | ICD-10-CM

## 2024-12-16 PROCEDURE — 99213 OFFICE O/P EST LOW 20 MIN: CPT

## 2024-12-16 RX ORDER — BUPRENORPHINE AND NALOXONE 8; 2 MG/1; MG/1
8 FILM, SOLUBLE BUCCAL; SUBLINGUAL DAILY
Qty: 30 FILM | Refills: 0 | Status: SHIPPED | OUTPATIENT
Start: 2024-12-16 | End: 2025-01-15

## 2024-12-16 NOTE — PROGRESS NOTES
[facility]     History and Physical  Ham Kline 69 y.o. male MRN: [id number]   @ Encounter: [id number]       1. Opioid use disorder, severe, dependence (HCC)  Assessment & Plan:  Recent hospitalization secondary to precipitated withdrawal. Reports that he took Suboxone 5-6 hours after last fentanyl use. Patient states that he uses fentanyl intermittently. He was previously on Suboxone through [facility]   Last use 12/12/24   No current withdrawal symptoms   Will begin Suboxone 8 mg daily   Orders:  -     buprenorphine-naloxone (Suboxone) 8-2 mg; Place 1 Film (8 mg total) under the tongue daily      In addition to the above recommendations, the patient will also be referred to the [facility]'s Certified Addiction Counselors for additional evaluation and psychotherapy. We will also engage our Certified  for patient support.       HPI: Ham Klien is a 69 y.o. year old male who presents for establishment with [facility]. Patient has a past medical history of hyperlipidemia, asthma, and opioid use disorder. He was recently hospitalized from 12/12/24-12/13/24 for toxic encephalopathy. Patient reports that he had put himself into precipitated withdrawal and was admitted for severe agitation and severe withdrawal symptoms. He states that he snorted a bag of fentanyl then took 1/2 film of suboxone 5 hours later. Patient reports that he does not use fentanyl daily. He is interested in suboxone MAT. Was on suboxone previously through [facility]. Patient states he has not used any illicit drugs since 12/12/24. He denies any current alcohol usage. Denies any hisotry history of IV Drug Use       Opioid History   Age of first use: 31    Opioids currently used: fentanyl - intermittently  Route of use: insufflation  Date/Time of Last Opioid Use: 12/12/24    Current Signs/Symptoms of Opioid Withdrawal: denies     Review of PDMP:     PDMP Review         Value Time User    PDMP  Reviewed  Yes 12/16/2024 10:05 AM Elizabeth Mo PA-C               Social History     Tobacco Use   Smoking Status Every Day    Current packs/day: 0.50    Types: Cigarettes   Smokeless Tobacco Never        Review of Systems   Constitutional:  Negative for chills.   Respiratory: Negative.     Gastrointestinal: Negative.    Musculoskeletal: Negative.    Psychiatric/Behavioral:  The patient is nervous/anxious.     ROS     Historical Information      Past Medical History:   Diagnosis Date    Asthma     Hyperlipidemia     Polysubstance abuse (HCC)         No past surgical history on file.     No family history on file.     Social History      Marital Status: /Civil Union    Occupation: Maintenance [employer]      Patient Pre-hospital Living Situation: lives with family      Communicable diseases:                   No Known Allergies     Prior to Admission medications    Medication Sig Start Date End Date Taking? Authorizing Provider   albuterol (ProAir HFA) 90 mcg/act inhaler Inhale 2 puffs every 6 (six) hours as needed for wheezing 7/17/23  Yes Cheyanne Benites PA-C   Aspirin Low Dose 81 MG EC tablet Take 81 mg by mouth daily 11/18/24  Yes Historical Provider, MD   atorvastatin (LIPITOR) 40 mg tablet Take 40 mg by mouth daily 10/4/24  Yes Historical Provider, MD   fluticasone-salmeterol (AIRDUO RESPICLICK) 113-14 mcg/act dry powder inhaler Inhale 1 puff 2 (two) times a day 11/18/24  Yes Historical Provider, MD       buprenorphine-naloxone     Objective      Vitals    Vitals:    12/16/24 0939   BP: 126/72   Pulse: 84       Physical Exam  Vitals reviewed.   Constitutional:       General: He is not in acute distress.     Appearance: He is not diaphoretic.   Neurological:      Mental Status: He is alert and oriented to person, place, and time.             COWS Score:          Data:     Lab Results:  Results from last 6 Months   Lab Units 12/13/24  0507   WBC Thousand/uL 9.08   HEMOGLOBIN g/dL 12.2   HEMATOCRIT %  39.5   PLATELETS Thousands/uL 302        Results from last 6 Months   Lab Units 12/13/24  0507   POTASSIUM mmol/L 3.9   CHLORIDE mmol/L 106   CO2 mmol/L 26   BUN mg/dL 9   CREATININE mg/dL 0.73   CALCIUM mg/dL 9.2   ALBUMIN g/dL 3.9   ALK PHOS U/L 62   ALT U/L 13   AST U/L 42*        Hepatitis panel:        HIV results:       TB:        Imaging Studies: Results Review Statement: No pertinent imaging studies reviewed.     EKG, Pathology, and Other Studies: Results Review Statement: No pertinent imaging studies reviewed.     Counseling / Coordination of Care     Total time spent today   32 minutes. Greater than 50% of total time was spent with the patient and / or family counseling and / or coordination of care.         ** Please Note: This note may have been constructed using a voice recognition system. **     Elizabeth Mo PA-C

## 2024-12-16 NOTE — TELEPHONE ENCOUNTER
Provider is out of office. There is no number on file to call to reschedule appointment with [facility]. Patient is scheduled for today 12/16 at 9am.

## 2024-12-16 NOTE — ASSESSMENT & PLAN NOTE
Recent hospitalization secondary to precipitated withdrawal. Reports that he took Suboxone 5-6 hours after last fentanyl use. Patient states that he uses fentanyl intermittently. He was previously on Suboxone through [facility]   Last use 12/12/24   No current withdrawal symptoms   Will begin Suboxone 8 mg daily

## 2025-01-14 ENCOUNTER — TELEPHONE (OUTPATIENT)
Dept: PSYCHIATRY | Facility: CLINIC | Age: 70
End: 2025-01-14